# Patient Record
Sex: FEMALE | Race: WHITE | URBAN - METROPOLITAN AREA
[De-identification: names, ages, dates, MRNs, and addresses within clinical notes are randomized per-mention and may not be internally consistent; named-entity substitution may affect disease eponyms.]

---

## 2018-09-11 ENCOUNTER — INPATIENT (INPATIENT)
Facility: HOSPITAL | Age: 84
LOS: 1 days | Discharge: HOME | DRG: 558 | End: 2018-09-13
Attending: HOSPITALIST | Admitting: INTERNAL MEDICINE
Payer: MEDICARE

## 2018-09-11 VITALS
HEART RATE: 83 BPM | TEMPERATURE: 98 F | OXYGEN SATURATION: 98 % | RESPIRATION RATE: 16 BRPM | SYSTOLIC BLOOD PRESSURE: 115 MMHG | DIASTOLIC BLOOD PRESSURE: 76 MMHG | HEIGHT: 63 IN | WEIGHT: 164.02 LBS
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
## 2018-09-11 DIAGNOSIS — R53.1 WEAKNESS: ICD-10-CM

## 2018-09-11 DIAGNOSIS — E86.0 DEHYDRATION: ICD-10-CM

## 2018-09-11 DIAGNOSIS — M62.82 RHABDOMYOLYSIS: ICD-10-CM

## 2018-09-11 DIAGNOSIS — Z29.9 ENCOUNTER FOR PROPHYLACTIC MEASURES, UNSPECIFIED: ICD-10-CM

## 2018-09-11 DIAGNOSIS — N39.0 URINARY TRACT INFECTION, SITE NOT SPECIFIED: ICD-10-CM

## 2018-09-11 DIAGNOSIS — F32.9 MAJOR DEPRESSIVE DISORDER, SINGLE EPISODE, UNSPECIFIED: ICD-10-CM

## 2018-09-11 LAB
ALBUMIN SERPL ELPH-MCNC: 3.7 G/DL — SIGNIFICANT CHANGE UP (ref 3.3–5)
ALP SERPL-CCNC: 90 U/L — SIGNIFICANT CHANGE UP (ref 40–120)
ALT FLD-CCNC: 22 U/L DA — SIGNIFICANT CHANGE UP (ref 10–45)
ANION GAP SERPL CALC-SCNC: 12 MMOL/L — SIGNIFICANT CHANGE UP (ref 5–17)
APPEARANCE UR: ABNORMAL
AST SERPL-CCNC: 38 U/L — SIGNIFICANT CHANGE UP (ref 10–40)
BACTERIA # UR AUTO: ABNORMAL /HPF
BILIRUB SERPL-MCNC: 0.9 MG/DL — SIGNIFICANT CHANGE UP (ref 0.2–1.2)
BILIRUB UR-MCNC: ABNORMAL
BUN SERPL-MCNC: 18 MG/DL — SIGNIFICANT CHANGE UP (ref 7–23)
CALCIUM SERPL-MCNC: 9.5 MG/DL — SIGNIFICANT CHANGE UP (ref 8.4–10.5)
CHLORIDE SERPL-SCNC: 101 MMOL/L — SIGNIFICANT CHANGE UP (ref 96–108)
CK SERPL-CCNC: 466 U/L — HIGH (ref 25–170)
CO2 SERPL-SCNC: 25 MMOL/L — SIGNIFICANT CHANGE UP (ref 22–31)
COLOR SPEC: ABNORMAL
COMMENT - URINE: SIGNIFICANT CHANGE UP
CREAT SERPL-MCNC: 0.74 MG/DL — SIGNIFICANT CHANGE UP (ref 0.5–1.3)
DIFF PNL FLD: ABNORMAL
EPI CELLS # UR: SIGNIFICANT CHANGE UP
GLUCOSE SERPL-MCNC: 141 MG/DL — HIGH (ref 70–99)
GLUCOSE UR QL: NEGATIVE — SIGNIFICANT CHANGE UP
HCT VFR BLD CALC: 48 % — HIGH (ref 34.5–45)
HGB BLD-MCNC: 16.4 G/DL — HIGH (ref 11.5–15.5)
KETONES UR-MCNC: ABNORMAL
LEUKOCYTE ESTERASE UR-ACNC: NEGATIVE — SIGNIFICANT CHANGE UP
MCHC RBC-ENTMCNC: 29.8 PG — SIGNIFICANT CHANGE UP (ref 27–34)
MCHC RBC-ENTMCNC: 34 GM/DL — SIGNIFICANT CHANGE UP (ref 32–36)
MCV RBC AUTO: 87.6 FL — SIGNIFICANT CHANGE UP (ref 80–100)
NITRITE UR-MCNC: POSITIVE
PH UR: 6 — SIGNIFICANT CHANGE UP (ref 5–8)
PLATELET # BLD AUTO: 420 K/UL — HIGH (ref 150–400)
POTASSIUM SERPL-MCNC: 4.2 MMOL/L — SIGNIFICANT CHANGE UP (ref 3.5–5.3)
POTASSIUM SERPL-SCNC: 4.2 MMOL/L — SIGNIFICANT CHANGE UP (ref 3.5–5.3)
PROT SERPL-MCNC: 8.3 G/DL — SIGNIFICANT CHANGE UP (ref 6–8.3)
PROT UR-MCNC: 30 MG/DL
RBC # BLD: 5.48 M/UL — HIGH (ref 3.8–5.2)
RBC # FLD: 11.9 % — SIGNIFICANT CHANGE UP (ref 10.3–14.5)
RBC CASTS # UR COMP ASSIST: SIGNIFICANT CHANGE UP /HPF (ref 0–4)
SODIUM SERPL-SCNC: 138 MMOL/L — SIGNIFICANT CHANGE UP (ref 135–145)
SP GR SPEC: 1.02 — SIGNIFICANT CHANGE UP (ref 1.01–1.02)
UROBILINOGEN FLD QL: 1
WBC # BLD: 10.5 K/UL — SIGNIFICANT CHANGE UP (ref 3.8–10.5)
WBC # FLD AUTO: 10.5 K/UL — SIGNIFICANT CHANGE UP (ref 3.8–10.5)
WBC UR QL: SIGNIFICANT CHANGE UP /HPF (ref 0–5)

## 2018-09-11 PROCEDURE — 99284 EMERGENCY DEPT VISIT MOD MDM: CPT

## 2018-09-11 PROCEDURE — 71045 X-RAY EXAM CHEST 1 VIEW: CPT | Mod: 26

## 2018-09-11 PROCEDURE — 99223 1ST HOSP IP/OBS HIGH 75: CPT

## 2018-09-11 PROCEDURE — 93010 ELECTROCARDIOGRAM REPORT: CPT

## 2018-09-11 RX ORDER — SODIUM CHLORIDE 9 MG/ML
1000 INJECTION INTRAMUSCULAR; INTRAVENOUS; SUBCUTANEOUS ONCE
Qty: 0 | Refills: 0 | Status: COMPLETED | OUTPATIENT
Start: 2018-09-11 | End: 2018-09-11

## 2018-09-11 RX ORDER — ACETAMINOPHEN 500 MG
650 TABLET ORAL EVERY 8 HOURS
Qty: 0 | Refills: 0 | Status: DISCONTINUED | OUTPATIENT
Start: 2018-09-11 | End: 2018-09-12

## 2018-09-11 RX ORDER — INFLUENZA VIRUS VACCINE 15; 15; 15; 15 UG/.5ML; UG/.5ML; UG/.5ML; UG/.5ML
0.5 SUSPENSION INTRAMUSCULAR ONCE
Qty: 0 | Refills: 0 | Status: COMPLETED | OUTPATIENT
Start: 2018-09-11 | End: 2018-09-12

## 2018-09-11 RX ORDER — SODIUM CHLORIDE 9 MG/ML
1000 INJECTION INTRAMUSCULAR; INTRAVENOUS; SUBCUTANEOUS
Qty: 0 | Refills: 0 | Status: DISCONTINUED | OUTPATIENT
Start: 2018-09-11 | End: 2018-09-13

## 2018-09-11 RX ORDER — DESVENLAFAXINE 50 MG/1
100 TABLET, EXTENDED RELEASE ORAL DAILY
Qty: 0 | Refills: 0 | Status: DISCONTINUED | OUTPATIENT
Start: 2018-09-11 | End: 2018-09-13

## 2018-09-11 RX ORDER — CEFTRIAXONE 500 MG/1
1 INJECTION, POWDER, FOR SOLUTION INTRAMUSCULAR; INTRAVENOUS ONCE
Qty: 0 | Refills: 0 | Status: COMPLETED | OUTPATIENT
Start: 2018-09-11 | End: 2018-09-11

## 2018-09-11 RX ORDER — ENOXAPARIN SODIUM 100 MG/ML
40 INJECTION SUBCUTANEOUS DAILY
Qty: 0 | Refills: 0 | Status: DISCONTINUED | OUTPATIENT
Start: 2018-09-11 | End: 2018-09-13

## 2018-09-11 RX ADMIN — Medication 650 MILLIGRAM(S): at 13:32

## 2018-09-11 RX ADMIN — ENOXAPARIN SODIUM 40 MILLIGRAM(S): 100 INJECTION SUBCUTANEOUS at 18:52

## 2018-09-11 RX ADMIN — CEFTRIAXONE 100 GRAM(S): 500 INJECTION, POWDER, FOR SOLUTION INTRAMUSCULAR; INTRAVENOUS at 12:20

## 2018-09-11 RX ADMIN — SODIUM CHLORIDE 75 MILLILITER(S): 9 INJECTION INTRAMUSCULAR; INTRAVENOUS; SUBCUTANEOUS at 22:26

## 2018-09-11 RX ADMIN — CEFTRIAXONE 1 GRAM(S): 500 INJECTION, POWDER, FOR SOLUTION INTRAMUSCULAR; INTRAVENOUS at 13:14

## 2018-09-11 RX ADMIN — SODIUM CHLORIDE 75 MILLILITER(S): 9 INJECTION INTRAMUSCULAR; INTRAVENOUS; SUBCUTANEOUS at 13:33

## 2018-09-11 RX ADMIN — SODIUM CHLORIDE 1000 MILLILITER(S): 9 INJECTION INTRAMUSCULAR; INTRAVENOUS; SUBCUTANEOUS at 10:50

## 2018-09-11 RX ADMIN — SODIUM CHLORIDE 1000 MILLILITER(S): 9 INJECTION INTRAMUSCULAR; INTRAVENOUS; SUBCUTANEOUS at 13:15

## 2018-09-11 RX ADMIN — DESVENLAFAXINE 100 MILLIGRAM(S): 50 TABLET, EXTENDED RELEASE ORAL at 18:52

## 2018-09-11 RX ADMIN — SODIUM CHLORIDE 1000 MILLILITER(S): 9 INJECTION INTRAMUSCULAR; INTRAVENOUS; SUBCUTANEOUS at 11:50

## 2018-09-11 RX ADMIN — Medication 650 MILLIGRAM(S): at 22:26

## 2018-09-11 RX ADMIN — Medication 650 MILLIGRAM(S): at 13:33

## 2018-09-11 RX ADMIN — SODIUM CHLORIDE 1000 MILLILITER(S): 9 INJECTION INTRAMUSCULAR; INTRAVENOUS; SUBCUTANEOUS at 12:21

## 2018-09-11 NOTE — CONSULT NOTE ADULT - SUBJECTIVE AND OBJECTIVE BOX
HPI:   Patient is a 87y female who lives at home and yesterday awakened very weak to the point her legs could not support her. SHe sat on the floor for 24 hours and then called her family today and was taken to the hospital and admitted. We are called. She got some fluid and a dose of ceftriaxone and feels totally back to herself now. She has no dysuria, hematuria, abdomen pain, back pain, cough, sob, n,v,d, ha, joint pain, joint swell, rash, fever, chills, sweats.     REVIEW OF SYSTEMS:  All other review of systems negative (Comprehensive ROS)    PAST MEDICAL & SURGICAL HISTORY:  Depression  No significant past surgical history      Allergies    No Known Allergies    Intolerances        Antimicrobials Day #  :    Other Medications:  acetaminophen   Tablet .. 650 milliGRAM(s) Oral every 8 hours  desvenlafaxine  milliGRAM(s) Oral daily  enoxaparin Injectable 40 milliGRAM(s) SubCutaneous daily  influenza   Vaccine 0.5 milliLiter(s) IntraMuscular once  sodium chloride 0.9%. 1000 milliLiter(s) IV Continuous <Continuous>      FAMILY HISTORY:  Family history of heart disease (Father, Mother)      SOCIAL HISTORY:  Smoking: [ ]Yes [ x]No  ETOH: [ ]Yes [ x]No  Drug Use: [ ]Yes [x ]No   x[ ] Single[ ]    T(F): 97.8 (18 @ 15:25), Max: 97.8 (18 @ 15:25)  HR: 77 (18 @ 15:25)  BP: 120/51 (18 @ 15:25)  RR: 14 (18 @ 15:25)  SpO2: 100% (18 @ 15:25)  Wt(kg): --    PHYSICAL EXAM:  General: alert, no acute distress  Eyes:  anicteric, no conjunctival injection, no discharge  Oropharynx: no lesions or injection 	  Neck: supple, without adenopathy  Lungs: clear to auscultation  Heart: s1s2 2/6 sys m  Abdomen: soft, nondistended, nontender, without mass or organomegaly  Skin: no lesions  Extremities: no clubbing, cyanosis, or edema  Neurologic: alert, oriented, moves all extremities, can stand, walk, good patella reflexes, feels  No back tenderness  LAB RESULTS:                        16.4   10.5  )-----------( 420      ( 11 Sep 2018 10:52 )             48.0         138  |  101  |  18  ----------------------------<  141<H>  4.2   |  25  |  0.74    Ca    9.5      11 Sep 2018 10:52    TPro  8.3  /  Alb  3.7  /  TBili  0.9  /  DBili  x   /  AST  38  /  ALT  22  /  AlkPhos  90      LIVER FUNCTIONS - ( 11 Sep 2018 10:52 )  Alb: 3.7 g/dL / Pro: 8.3 g/dL / ALK PHOS: 90 U/L / ALT: 22 U/L DA / AST: 38 U/L / GGT: x           Urinalysis Basic - ( 11 Sep 2018 11:00 )    Color: Zuleima / Appearance: Slightly Turbid / S.025 / pH: x  Gluc: x / Ketone: Large  / Bili: Small / Urobili: 1   Blood: x / Protein: 30 mg/dL / Nitrite: Positive   Leuk Esterase: Negative / RBC: 0-4 /HPF / WBC 0-2 /HPF   Sq Epi: x / Non Sq Epi: Neg.-Few / Bacteria: Many /HPF        MICROBIOLOGY:  RECENT CULTURES:        RADIOLOGY REVIEWED:      < from: Xray Chest 1 View- PORTABLE-Urgent (18 @ 13:32) >  Impression: Grossly clear lungs.      < end of copied text >      Impression:  Patient who had transient day of weakness that has now resolved. She improved just with fluid and a dose of ceftriaxone however no infection is overtly apparent. There was concern for uti but she has no pyuria or gu symptoms. She has positive nitrate and may have assymptomatic bactiuria but uti is not present.  I find no infection on exam or by history. She is now able to walk and support her weight and has good reflexes and feels everything so spinal cord injury is not apparent either.       Recommendations:  Monitor off antibiotics.

## 2018-09-11 NOTE — H&P ADULT - ATTENDING COMMENTS
Plan of care discussed with patient, and daughter at bedside, agrees, all questions answered.   Josi Cabrera MD

## 2018-09-11 NOTE — ED PROVIDER NOTE - PROGRESS NOTE DETAILS
Oral FRANCES/ANTOINETTE GARCIA to assess if pt can get home care as she wants to go home today. I was advised that this wouldn't be possible and the patient may also work on arranging but will not be set up for today. Patient lives alone . Daughter just bought a new home and it is under construction. May not be safe environment for her. Oral: Patient able to stand with assistance of MD, RN, and PA in the ED. Patient does describe weakness. Nonfocal exam. Pt has CK of 460. Large Ketones, Nitrite positive urine with bacteria.   I D/W Kyung GARCIA to assess if pt can get home care as she has will to go home today. I was advised that this wouldn't be possible and the patient may also work on arranging but will not be set up for today. Patient lives alone . Daughter just bought a new home and it is under construction. May not be safe environment for her. Oral: Patient able to stand with assistance of the MD, RN, and PA while in the ED. Patient does describe weakness. Nonfocal exam. Pt has CK of 460. Large Ketones, Nitrite positive urine with bacteria.   I D/W Kyung GARCIA to assess if pt can get home care as she has will to go home today. I was advised that this wouldn't be possible and the patient may also work on arranging but will not be set up for today. Patient lives alone . Daughter just bought a new home and it is under construction. May not be safe environment for her.

## 2018-09-11 NOTE — ED ADULT NURSE NOTE - NSIMPLEMENTINTERV_GEN_ALL_ED
Implemented All Fall with Harm Risk Interventions:  [place] to call system. Call bell, personal items and telephone within reach. Instruct patient to call for assistance. Room bathroom lighting operational. Non-slip footwear when patient is off stretcher. Physically safe environment: no spills, clutter or unnecessary equipment. Stretcher in lowest position, wheels locked, appropriate side rails in place. Provide visual cue, wrist band, yellow gown, etc. Monitor gait and stability. Monitor for mental status changes and reorient to person, place, and time. Review medications for side effects contributing to fall risk. Reinforce activity limits and safety measures with patient and family. Provide visual clues: red socks.

## 2018-09-11 NOTE — H&P ADULT - NSHPLABSRESULTS_GEN_ALL_CORE
LABS:                        16.4   10.5  )-----------( 420      ( 11 Sep 2018 10:52 )             48.0     -11    138  |  101  |  18  ----------------------------<  141<H>  4.2   |  25  |  0.74    Ca    9.5      11 Sep 2018 10:52    TPro  8.3  /  Alb  3.7  /  TBili  0.9  /  DBili  x   /  AST  38  /  ALT  22  /  AlkPhos  90  09-11      Urinalysis Basic - ( 11 Sep 2018 11:00 )    Color: Zuleima / Appearance: Slightly Turbid / S.025 / pH: x  Gluc: x / Ketone: Large  / Bili: Small / Urobili: 1   Blood: x / Protein: 30 mg/dL / Nitrite: Positive   Leuk Esterase: Negative / RBC: 0-4 /HPF / WBC 0-2 /HPF   Sq Epi: x / Non Sq Epi: Neg.-Few / Bacteria: Many /HPF LABS:                        16.4   10.5  )-----------( 420      ( 11 Sep 2018 10:52 )             48.0     -11    138  |  101  |  18  ----------------------------<  141<H>  4.2   |  25  |  0.74    Ca    9.5      11 Sep 2018 10:52    TPro  8.3  /  Alb  3.7  /  TBili  0.9  /  DBili  x   /  AST  38  /  ALT  22  /  AlkPhos  90  09-11      Urinalysis Basic - ( 11 Sep 2018 11:00 )    Color: Zuleima / Appearance: Slightly Turbid / S.025 / pH: x  Gluc: x / Ketone: Large  / Bili: Small / Urobili: 1   Blood: x / Protein: 30 mg/dL / Nitrite: Positive   Leuk Esterase: Negative / RBC: 0-4 /HPF / WBC 0-2 /HPF   Sq Epi: x / Non Sq Epi: Neg.-Few / Bacteria: Many /HPF        EKG Date/Time: 11-Sep-2018 12:56   Normal sinus rhythm, Rate: 82 LABS:                        16.4   10.5  )-----------( 420      ( 11 Sep 2018 10:52 )             48.0     -11    138  |  101  |  18  ----------------------------<  141<H>  4.2   |  25  |  0.74    Ca    9.5      11 Sep 2018 10:52    TPro  8.3  /  Alb  3.7  /  TBili  0.9  /  DBili  x   /  AST  38  /  ALT  22  /  AlkPhos  90  09-11      Urinalysis Basic - ( 11 Sep 2018 11:00 )  Color: Zuleima / Appearance: Slightly Turbid / S.025 / pH: x  Gluc: x / Ketone: Large  / Bili: Small / Urobili: 1   Blood: x / Protein: 30 mg/dL / Nitrite: Positive   Leuk Esterase: Negative / RBC: 0-4 /HPF / WBC 0-2 /HPF   Sq Epi: x / Non Sq Epi: Neg.-Few / Bacteria: Many /HPF        EKG Date/Time: 11-Sep-2018 12:56   Normal sinus rhythm, Rate: 82

## 2018-09-11 NOTE — ED PROVIDER NOTE - ATTENDING CONTRIBUTION TO CARE
Jean with DILIA Orozco. Patient is with gen weakness and +UTI. Exam nml. Pt able to stand and walk few steps with assistance. Feels weak. Fall risk. No head injury; No LOC; Plan for labs with UA and culture.  I performed a face to face bedside interview with patient regarding history of present illness, review of symptoms and past medical history. I completed an independent physical exam.  I have discussed the patient's plan of care with Physician Assistant (PA). I agree with note as stated above, having amended the EMR as needed to reflect my findings.   This includes History of Present Illness, HIV, Past Medical/Surgical/Family/Social History, Allergies and Home Medications, Review of Systems, Physical Exam, and any Progress Notes during the time I functioned as the attending physician for this patient. Jean with DILIA Orozco. Patient is with gen weakness and +UTI. Exam nonfocal; Feels weak. Fall risk. No head injury; No LOC; Plan for labs with UA and culture. UA with nitrite positivity. Culture sent. 1 dose abx given.  I performed a face to face bedside interview with patient regarding history of present illness, review of symptoms and past medical history. I completed an independent physical exam.  I have discussed the patient's plan of care with Physician Assistant (PA). I agree with note as stated above, having amended the EMR as needed to reflect my findings.   This includes History of Present Illness, HIV, Past Medical/Surgical/Family/Social History, Allergies and Home Medications, Review of Systems, Physical Exam, and any Progress Notes during the time I functioned as the attending physician for this patient. Jean with DILIA Orozco. Patient is with gen weakness and +UTI. Exam nonfocal; Feels weak. Fall risk. No head injury; No LOC; Plan for labs with UA and culture. UA with nitrite positivity. Culture sent. 1 dose abx given. Fall risk if sent home today without assistance. I performed a face to face bedside interview with patient regarding history of present illness, review of symptoms and past medical history. I completed an independent physical exam.  I have discussed the patient's plan of care with Physician Assistant (PA).

## 2018-09-11 NOTE — ED PROVIDER NOTE - DURATION
The patient is awake, alert and acting age appropriately. Family at bedside.    No apparent distress noted. Airway is open and patent.  Respirations with normal effort and rate noted. Explanation of care provided to family and patient. No needs at this time. Will continue to monitor.    yesterday

## 2018-09-11 NOTE — H&P ADULT - PROBLEM SELECTOR PLAN 5
[No] Previous VTE                                                3  [No] Thrombophilia                                             2  [Yes] Lower limb paralysis/ weakness                    2    [No] Current Cancer                                             2   [Yes] Immobilization > 24 hrs                              1  [No] ICU/CCU stay > 24 hours                             1  [Yes] Age > 60                                                     1    IMPROVE VTE Score: 4, DVT ppx with lovenox.

## 2018-09-11 NOTE — ED PROVIDER NOTE - PHYSICAL EXAMINATION
AAOx3  Heart: s1/s2, RRR  Lung: CTA b/l  Abd: soft, NT/ND, no rebound or guarding, NCVAT  Neuro: 5/5 muscle strength of b/l LE and UE, normal sensation in all extremity.  Patient able to stand and ambulate with assistance   2+ pedal pulses b/l   no focal neuro deficits on exam

## 2018-09-11 NOTE — H&P ADULT - HISTORY OF PRESENT ILLNESS
87 year old female presents from home s/p 87 year old female presents from home for complain of generalized weakness x 1 day. Patient reports difficulty walking since yesterday since waking up in the morning. Patient states she felt weak and she sat on floor and was unable to get up the floor for the whole day. Patient denies any fall, loc, headache, dizziness, chest pain, sob, palpitation, abdominal pain, nausea, vomiting, any urinary or bowel complaints. 87 year old female presents from home for complain of generalized weakness x 1 day. Patient reports difficulty walking since yesterday since waking up in the morning. Patient states she felt weak when she woke up and her legs won't hold her and she sat on floor and was unable to get up the floor for the whole day. She was "crawling on her tush". Patient reports intermittent joint pains kaley bilateral shoulders due to her arthritis. Denies any fall, loc, headache, dizziness, chest pain, sob, palpitation, abdominal pain, nausea, vomiting, any urinary or bowel complaints. 87 year old female presents from home for complain of generalized weakness x 1 day. Patient reports difficulty walking since yesterday, since waking up in the morning. Patient states she felt weak when she woke up and her legs won't hold her and she sat on floor and was unable to get up from the floor for the whole day. She was "crawling on her tush". Patient reports intermittent joint pains kaley bilateral shoulders due to her arthritis. Patient states she has a life alert button but it wasn't next to her. Denies any fall, loc, headache, dizziness, chest pain, sob, palpitation, abdominal pain, nausea, vomiting, any urinary or bowel complaints.

## 2018-09-11 NOTE — H&P ADULT - NSHPPHYSICALEXAM_GEN_ALL_CORE
PHYSICAL EXAM:  T(C): 36.5 (09-11-18 @ 11:57), Max: 36.5 (09-11-18 @ 11:57)  HR: 71 (09-11-18 @ 11:57) (71 - 83)  BP: 138/77 (09-11-18 @ 11:57) (115/76 - 138/77)  RR: 15 (09-11-18 @ 11:57) (15 - 16)  SpO2: 97% (09-11-18 @ 11:57) (97% - 98%)      GENERAL: NAD, well-groomed, well-developed  HEAD:  Atraumatic, Normocephalic  EYES: EOMI, PERRL, conjunctiva and sclera clear  ENMT: No tonsillar erythema, exudates, or enlargement; Moist mucous membranes, Good dentition, No lesions  NECK: Supple, No JVD, Normal thyroid  NERVOUS SYSTEM:  Alert & Oriented X3, Good concentration; Motor Strength 5/5 B/L upper and lower extremities; DTRs 2+ intact and symmetric  CHEST/LUNG: Clear to ascultation bilaterally; No rales, rhonchi, wheezing, or rubs  HEART: Regular rate and rhythm; No murmurs, rubs, or gallops  ABDOMEN: Soft, Nontender, Nondistended; Bowel sounds present  EXTREMITIES:  2+ Peripheral Pulses, No clubbing, cyanosis, or edema  SKIN: No rashes or lesions PHYSICAL EXAM:  T(C): 36.5 (09-11-18 @ 11:57), Max: 36.5 (09-11-18 @ 11:57)  HR: 71 (09-11-18 @ 11:57) (71 - 83)  BP: 138/77 (09-11-18 @ 11:57) (115/76 - 138/77)  RR: 15 (09-11-18 @ 11:57) (15 - 16)  SpO2: 97% (09-11-18 @ 11:57) (97% - 98%)      GENERAL: Elderly female, NAD, well-groomed, well-developed  HEAD:  Atraumatic, Normocephalic  EYES: EOMI, PERRL, conjunctiva and sclera clear  ENMT: No tonsillar erythema, exudates, or enlargement; Moist mucous membranes, Good dentition, No lesions  NECK: Supple, No JVD, Normal thyroid  NERVOUS SYSTEM:  Alert & Oriented X3, Good concentration; Motor Strength 5/5 B/L upper and lower extremities; DTRs 2+ intact and symmetric  CHEST/LUNG: Clear to ascultation bilaterally; No rales, rhonchi, wheezing, or rubs  HEART: Regular rate and rhythm; No murmurs, rubs, or gallops  ABDOMEN: Soft, Nontender, Nondistended; Bowel sounds present  EXTREMITIES:  2+ Peripheral Pulses, No clubbing, cyanosis, or edema  SKIN: No rashes or lesions PHYSICAL EXAM:  T(C): 36.5 (09-11-18 @ 11:57), Max: 36.5 (09-11-18 @ 11:57)  HR: 71 (09-11-18 @ 11:57) (71 - 83)  BP: 138/77 (09-11-18 @ 11:57) (115/76 - 138/77)  RR: 15 (09-11-18 @ 11:57) (15 - 16)  SpO2: 97% (09-11-18 @ 11:57) (97% - 98%)      GENERAL: Elderly female, NAD, well-groomed, well-developed  HEAD:  Atraumatic, Normocephalic  EYES: EOMI, PERRL, conjunctiva and sclera clear  ENMT: Dry mucous membranes,   NECK: Supple, No JVD, Normal thyroid  NERVOUS SYSTEM:  Alert & Oriented X3, Good concentration; Motor Strength 5/5 B/L upper and lower extremities;   CHEST/LUNG: Clear to ascultation bilaterally; No rales, rhonchi, wheezing, or rubs  HEART: Regular rate and rhythm; No murmurs, rubs, or gallops  ABDOMEN: Soft, Nontender, Nondistended; Bowel sounds present  EXTREMITIES:  2+ Peripheral Pulses, No clubbing, cyanosis, or edema  SKIN: No rashes or lesions

## 2018-09-11 NOTE — H&P ADULT - PROBLEM SELECTOR PROBLEM 1
Urinary tract infection without hematuria, site unspecified Non-traumatic rhabdomyolysis Dehydration

## 2018-09-11 NOTE — H&P ADULT - PROBLEM SELECTOR PLAN 2
dehydration, gently hydration  monitor labs  PT eval dehydration, gentle hydration  patient given a dose of IV Rocephin in ED, patient afebrile, no wbc, u/a- negative, will not continue antibiotics  monitor labs  PT eval dehydration, gentle hydration  patient given a dose of IV Rocephin in ED, patient afebrile, no urinary symptoms, no wbc, no indication at this time to continue antibiotics, f/u ID  monitor labs  PT eval patient given a dose of IV Rocephin in ED, patient afebrile, no urinary symptoms, no wbc, no indication at this time to continue antibiotics, f/u ID  monitor labs  PT eval

## 2018-09-11 NOTE — ED PROVIDER NOTE - CARE PLAN
Principal Discharge DX:	Weakness  Secondary Diagnosis:	Urinary tract infection without hematuria, site unspecified Principal Discharge DX:	Weakness

## 2018-09-11 NOTE — ED PROVIDER NOTE - OBJECTIVE STATEMENT
86 y/o F was BIB her daughter from home for generalized weakness x yesterday. Patient reports her legs felt weak, and she was unable to bear weight. Per patients daughter, she was in usual state of health 3 days ago, walking without difficulty. Patient denies fall, head trauma, CP, SOB, palpitations,  abd pain, n/v, f/c or urinary symptoms

## 2018-09-11 NOTE — CHART NOTE - NSCHARTNOTEFT_GEN_A_CORE
Called by Dr Mixon regarding pt in ED, pt currently wants to return home but would like someone to stay with her. Advised Dr Mixon that insurance does not cover [facility] care, this is something the patient would have to arrange on her own. Pt currently does not have a home care need.

## 2018-09-11 NOTE — ED PROVIDER NOTE - MEDICAL DECISION MAKING DETAILS
86 y/o F was BIB her daughter from home for generalized weakness x yesterday.  Plan: Labs reviewed, CK-466, pt was hydrated. UA-- UTI noted, urine culture sent. Patient started on Ceftriaxone. She is a fall risk and can not be safely discharged, will admit for generalized weakness.

## 2018-09-12 LAB
24R-OH-CALCIDIOL SERPL-MCNC: 17.5 NG/ML — LOW (ref 30–80)
ALBUMIN SERPL ELPH-MCNC: 2.8 G/DL — LOW (ref 3.3–5)
ALP SERPL-CCNC: 67 U/L — SIGNIFICANT CHANGE UP (ref 40–120)
ALT FLD-CCNC: 23 U/L DA — SIGNIFICANT CHANGE UP (ref 10–45)
ANION GAP SERPL CALC-SCNC: 8 MMOL/L — SIGNIFICANT CHANGE UP (ref 5–17)
AST SERPL-CCNC: 27 U/L — SIGNIFICANT CHANGE UP (ref 10–40)
BASOPHILS # BLD AUTO: 0.1 K/UL — SIGNIFICANT CHANGE UP (ref 0–0.2)
BASOPHILS NFR BLD AUTO: 1 % — SIGNIFICANT CHANGE UP (ref 0–2)
BILIRUB SERPL-MCNC: 0.3 MG/DL — SIGNIFICANT CHANGE UP (ref 0.2–1.2)
BUN SERPL-MCNC: 23 MG/DL — SIGNIFICANT CHANGE UP (ref 7–23)
CALCIUM SERPL-MCNC: 8.8 MG/DL — SIGNIFICANT CHANGE UP (ref 8.4–10.5)
CHLORIDE SERPL-SCNC: 106 MMOL/L — SIGNIFICANT CHANGE UP (ref 96–108)
CK SERPL-CCNC: 265 U/L — HIGH (ref 25–170)
CO2 SERPL-SCNC: 25 MMOL/L — SIGNIFICANT CHANGE UP (ref 22–31)
CREAT SERPL-MCNC: 0.7 MG/DL — SIGNIFICANT CHANGE UP (ref 0.5–1.3)
EOSINOPHIL # BLD AUTO: 0.2 K/UL — SIGNIFICANT CHANGE UP (ref 0–0.5)
EOSINOPHIL NFR BLD AUTO: 3.1 % — SIGNIFICANT CHANGE UP (ref 0–6)
FOLATE SERPL-MCNC: 11 NG/ML — SIGNIFICANT CHANGE UP
GLUCOSE SERPL-MCNC: 140 MG/DL — HIGH (ref 70–99)
HBA1C BLD-MCNC: 6.1 % — HIGH (ref 4–5.6)
HCT VFR BLD CALC: 38.2 % — SIGNIFICANT CHANGE UP (ref 34.5–45)
HGB BLD-MCNC: 12.6 G/DL — SIGNIFICANT CHANGE UP (ref 11.5–15.5)
LYMPHOCYTES # BLD AUTO: 2.4 K/UL — SIGNIFICANT CHANGE UP (ref 1–3.3)
LYMPHOCYTES # BLD AUTO: 43.4 % — SIGNIFICANT CHANGE UP (ref 13–44)
MCHC RBC-ENTMCNC: 29.2 PG — SIGNIFICANT CHANGE UP (ref 27–34)
MCHC RBC-ENTMCNC: 33 GM/DL — SIGNIFICANT CHANGE UP (ref 32–36)
MCV RBC AUTO: 88.2 FL — SIGNIFICANT CHANGE UP (ref 80–100)
MONOCYTES # BLD AUTO: 0.7 K/UL — SIGNIFICANT CHANGE UP (ref 0–0.9)
MONOCYTES NFR BLD AUTO: 12.5 % — HIGH (ref 1–9)
NEUTROPHILS # BLD AUTO: 2.2 K/UL — SIGNIFICANT CHANGE UP (ref 1.8–7.4)
NEUTROPHILS NFR BLD AUTO: 40 % — LOW (ref 43–77)
PLATELET # BLD AUTO: 406 K/UL — HIGH (ref 150–400)
POTASSIUM SERPL-MCNC: 3.5 MMOL/L — SIGNIFICANT CHANGE UP (ref 3.5–5.3)
POTASSIUM SERPL-SCNC: 3.5 MMOL/L — SIGNIFICANT CHANGE UP (ref 3.5–5.3)
PROT SERPL-MCNC: 6.5 G/DL — SIGNIFICANT CHANGE UP (ref 6–8.3)
RBC # BLD: 4.33 M/UL — SIGNIFICANT CHANGE UP (ref 3.8–5.2)
RBC # FLD: 11.9 % — SIGNIFICANT CHANGE UP (ref 10.3–14.5)
SODIUM SERPL-SCNC: 139 MMOL/L — SIGNIFICANT CHANGE UP (ref 135–145)
TSH SERPL-MCNC: 3.64 UIU/ML — SIGNIFICANT CHANGE UP (ref 0.27–4.2)
VIT B12 SERPL-MCNC: 281 PG/ML — SIGNIFICANT CHANGE UP (ref 232–1245)
WBC # BLD: 5.6 K/UL — SIGNIFICANT CHANGE UP (ref 3.8–10.5)
WBC # FLD AUTO: 5.6 K/UL — SIGNIFICANT CHANGE UP (ref 3.8–10.5)

## 2018-09-12 PROCEDURE — 99233 SBSQ HOSP IP/OBS HIGH 50: CPT

## 2018-09-12 PROCEDURE — 93010 ELECTROCARDIOGRAM REPORT: CPT

## 2018-09-12 RX ORDER — ZOLPIDEM TARTRATE 10 MG/1
5 TABLET ORAL AT BEDTIME
Qty: 0 | Refills: 0 | Status: DISCONTINUED | OUTPATIENT
Start: 2018-09-12 | End: 2018-09-13

## 2018-09-12 RX ADMIN — INFLUENZA VIRUS VACCINE 0.5 MILLILITER(S): 15; 15; 15; 15 SUSPENSION INTRAMUSCULAR at 22:12

## 2018-09-12 RX ADMIN — SODIUM CHLORIDE 75 MILLILITER(S): 9 INJECTION INTRAMUSCULAR; INTRAVENOUS; SUBCUTANEOUS at 10:11

## 2018-09-12 RX ADMIN — Medication 650 MILLIGRAM(S): at 07:15

## 2018-09-12 RX ADMIN — ENOXAPARIN SODIUM 40 MILLIGRAM(S): 100 INJECTION SUBCUTANEOUS at 11:17

## 2018-09-12 RX ADMIN — Medication 650 MILLIGRAM(S): at 00:19

## 2018-09-12 RX ADMIN — DESVENLAFAXINE 100 MILLIGRAM(S): 50 TABLET, EXTENDED RELEASE ORAL at 11:17

## 2018-09-12 RX ADMIN — ZOLPIDEM TARTRATE 5 MILLIGRAM(S): 10 TABLET ORAL at 23:08

## 2018-09-12 RX ADMIN — ZOLPIDEM TARTRATE 5 MILLIGRAM(S): 10 TABLET ORAL at 00:20

## 2018-09-12 RX ADMIN — SODIUM CHLORIDE 75 MILLILITER(S): 9 INJECTION INTRAMUSCULAR; INTRAVENOUS; SUBCUTANEOUS at 22:16

## 2018-09-12 RX ADMIN — Medication 650 MILLIGRAM(S): at 05:50

## 2018-09-12 NOTE — PROGRESS NOTE ADULT - ASSESSMENT
87 year old female presented from home for complaints of generalized weakness x 1 day. Patient reported difficulty walking and stated her legs won't hold her  was unable to get up from the floor for the whole day. No symptoms of chest pain, dyspnea, dizziness prior to episode.

## 2018-09-12 NOTE — PHYSICAL THERAPY INITIAL EVALUATION ADULT - CRITERIA FOR SKILLED THERAPEUTIC INTERVENTIONS
functional limitations in following categories/risk reduction/prevention/impairments found/anticipated discharge recommendation

## 2018-09-12 NOTE — PROGRESS NOTE ADULT - ATTENDING COMMENTS
I have personally seen and examined patient on the above date.  I discussed the case with PA and I agree with findings and plan as detailed per note above, which I have amended where appropriate.

## 2018-09-12 NOTE — PROGRESS NOTE ADULT - SUBJECTIVE AND OBJECTIVE BOX
Patient is a 87y old  Female who presents with a chief complaint of weakness (11 Sep 2018 19:16)  Patient seen and examined at bedside.  Today pt. feels better.  No chest pain, no shortness of breath.    ALLERGIES:  No Known Allergies    MEDICATIONS  (STANDING):  desvenlafaxine  milliGRAM(s) Oral daily  enoxaparin Injectable 40 milliGRAM(s) SubCutaneous daily  influenza   Vaccine 0.5 milliLiter(s) IntraMuscular once  sodium chloride 0.9%. 1000 milliLiter(s) (75 mL/Hr) IV Continuous <Continuous>    MEDICATIONS  (PRN):  zolpidem 5 milliGRAM(s) Oral at bedtime PRN Sleep    Vital Signs Last 24 Hrs  T(F): 97.4 (12 Sep 2018 05:47), Max: 97.8 (11 Sep 2018 15:25)  HR: 73 (12 Sep 2018 05:47) (71 - 77)  BP: 132/71 (12 Sep 2018 05:47) (120/51 - 144/59)  RR: 15 (12 Sep 2018 05:47) (14 - 15)  SpO2: 100% (12 Sep 2018 05:47) (97% - 100%)  I&O's Summary    11 Sep 2018 07:01  -  12 Sep 2018 07:00  --------------------------------------------------------  IN: 900 mL / OUT: 1 mL / NET: 899 mL      PHYSICAL EXAM:  General: NAD, A/O x 3  ENT: MMM  Neck: Supple, No JVD  Lungs: Clear to auscultation bilaterally  Cardio: RRR, S1/S2, No murmurs  Abdomen: Soft, Nontender, Nondistended; Bowel sounds present  Extremities: No calf tenderness, No pitting edema    LABS:                        12.6   5.6   )-----------( 406      ( 12 Sep 2018 06:23 )             38.2     -    139  |  106  |  23  ----------------------------<  140  3.5   |  25  |  0.70    Ca    8.8      12 Sep 2018 06:23    TPro  6.5  /  Alb  2.8  /  TBili  0.3  /  DBili  x   /  AST  27  /  ALT  23  /  AlkPhos  67      eGFR if Non African American: 78 mL/min/1.73M2 (18 @ 06:23)  eGFR if : 91 mL/min/1.73M2 (18 @ 06:23)        CARDIAC MARKERS ( 12 Sep 2018 06:35 )  x     / x     / 265 U/L / x     / x      CARDIAC MARKERS ( 11 Sep 2018 10:52 )  x     / x     / 466 U/L / x     / x      CAPILLARY BLOOD GLUCOSE      Urinalysis Basic - ( 11 Sep 2018 11:00 )    Color: Zuleima / Appearance: Slightly Turbid / S.025 / pH: x  Gluc: x / Ketone: Large  / Bili: Small / Urobili: 1   Blood: x / Protein: 30 mg/dL / Nitrite: Positive   Leuk Esterase: Negative / RBC: 0-4 /HPF / WBC 0-2 /HPF   Sq Epi: x / Non Sq Epi: Neg.-Few / Bacteria: Many /HPF          RADIOLOGY & ADDITIONAL TESTS:    Care Discussed with Consultants/Other Providers:

## 2018-09-12 NOTE — PROGRESS NOTE ADULT - SUBJECTIVE AND OBJECTIVE BOX
CC: f/u for  weakness  Patient reports  she feels perfectly well  REVIEW OF SYSTEMS:  All other review of systems negative (Comprehensive ROS)    Antimicrobials Day #  :    Other Medications Reviewed    T(F): 97.4 (18 @ 05:47), Max: 97.4 (18 @ 22:27)  HR: 73 (18 @ 05:47)  BP: 132/71 (18 @ 05:47)  RR: 15 (18 @ 05:47)  SpO2: 100% (18 @ 05:47)  Wt(kg): --    PHYSICAL EXAM:  General: alert, no acute distress  Eyes:  anicteric, no conjunctival injection, no discharge  Oropharynx: no lesions or injection 	  Neck: supple, without adenopathy  Lungs: clear to auscultation  Heart: regular rate and rhythm 2/6 sys m  Abdomen: soft, nondistended, nontender, without mass or organomegaly  Skin: no lesions  Extremities: no clubbing, cyanosis, or edema  Neurologic: alert, oriented, moves all extremities    LAB RESULTS:                        12.6   5.6   )-----------( 406      ( 12 Sep 2018 06:23 )             38.2     -12    139  |  106  |  23  ----------------------------<  140<H>  3.5   |  25  |  0.70    Ca    8.8      12 Sep 2018 06:23    TPro  6.5  /  Alb  2.8<L>  /  TBili  0.3  /  DBili  x   /  AST  27  /  ALT  23  /  AlkPhos  67  -12    LIVER FUNCTIONS - ( 12 Sep 2018 06:23 )  Alb: 2.8 g/dL / Pro: 6.5 g/dL / ALK PHOS: 67 U/L / ALT: 23 U/L DA / AST: 27 U/L / GGT: x           Urinalysis Basic - ( 11 Sep 2018 11:00 )    Color: Zuleima / Appearance: Slightly Turbid / S.025 / pH: x  Gluc: x / Ketone: Large  / Bili: Small / Urobili: 1   Blood: x / Protein: 30 mg/dL / Nitrite: Positive   Leuk Esterase: Negative / RBC: 0-4 /HPF / WBC 0-2 /HPF   Sq Epi: x / Non Sq Epi: Neg.-Few / Bacteria: Many /HPF      MICROBIOLOGY:  RECENT CULTURES:      RADIOLOGY REVIEWED:      < from: Xray Chest 1 View- PORTABLE-Urgent (18 @ 13:32) >    Impression: Grossly clear lungs.        < end of copied text >          Assessment:  Patient admitted with severe diffuse weakness that has resolved. No infection is apparent . she got better from fluid  Plan: monitor off abx  d/c planning

## 2018-09-13 ENCOUNTER — TRANSCRIPTION ENCOUNTER (OUTPATIENT)
Age: 84
End: 2018-09-13

## 2018-09-13 VITALS
WEIGHT: 182.98 LBS | HEART RATE: 71 BPM | OXYGEN SATURATION: 98 % | DIASTOLIC BLOOD PRESSURE: 57 MMHG | RESPIRATION RATE: 14 BRPM | SYSTOLIC BLOOD PRESSURE: 125 MMHG | TEMPERATURE: 98 F

## 2018-09-13 DIAGNOSIS — D47.3 ESSENTIAL (HEMORRHAGIC) THROMBOCYTHEMIA: ICD-10-CM

## 2018-09-13 DIAGNOSIS — E87.6 HYPOKALEMIA: ICD-10-CM

## 2018-09-13 LAB
-  AMIKACIN: SIGNIFICANT CHANGE UP
-  AMOXICILLIN/CLAVULANIC ACID: SIGNIFICANT CHANGE UP
-  AMPICILLIN/SULBACTAM: SIGNIFICANT CHANGE UP
-  AMPICILLIN: SIGNIFICANT CHANGE UP
-  AZTREONAM: SIGNIFICANT CHANGE UP
-  CEFAZOLIN: SIGNIFICANT CHANGE UP
-  CEFEPIME: SIGNIFICANT CHANGE UP
-  CEFOXITIN: SIGNIFICANT CHANGE UP
-  CEFTRIAXONE: SIGNIFICANT CHANGE UP
-  CIPROFLOXACIN: SIGNIFICANT CHANGE UP
-  ERTAPENEM: SIGNIFICANT CHANGE UP
-  GENTAMICIN: SIGNIFICANT CHANGE UP
-  IMIPENEM: SIGNIFICANT CHANGE UP
-  LEVOFLOXACIN: SIGNIFICANT CHANGE UP
-  MEROPENEM: SIGNIFICANT CHANGE UP
-  NITROFURANTOIN: SIGNIFICANT CHANGE UP
-  PIPERACILLIN/TAZOBACTAM: SIGNIFICANT CHANGE UP
-  TIGECYCLINE: SIGNIFICANT CHANGE UP
-  TOBRAMYCIN: SIGNIFICANT CHANGE UP
-  TRIMETHOPRIM/SULFAMETHOXAZOLE: SIGNIFICANT CHANGE UP
ANION GAP SERPL CALC-SCNC: 8 MMOL/L — SIGNIFICANT CHANGE UP (ref 5–17)
BUN SERPL-MCNC: 12 MG/DL — SIGNIFICANT CHANGE UP (ref 7–23)
CALCIUM SERPL-MCNC: 8.6 MG/DL — SIGNIFICANT CHANGE UP (ref 8.4–10.5)
CHLORIDE SERPL-SCNC: 110 MMOL/L — HIGH (ref 96–108)
CK SERPL-CCNC: 412 U/L — HIGH (ref 25–170)
CO2 SERPL-SCNC: 26 MMOL/L — SIGNIFICANT CHANGE UP (ref 22–31)
CREAT SERPL-MCNC: 0.61 MG/DL — SIGNIFICANT CHANGE UP (ref 0.5–1.3)
CULTURE RESULTS: SIGNIFICANT CHANGE UP
GLUCOSE SERPL-MCNC: 122 MG/DL — HIGH (ref 70–99)
HCT VFR BLD CALC: 38.3 % — SIGNIFICANT CHANGE UP (ref 34.5–45)
HGB BLD-MCNC: 12.9 G/DL — SIGNIFICANT CHANGE UP (ref 11.5–15.5)
MCHC RBC-ENTMCNC: 29.7 PG — SIGNIFICANT CHANGE UP (ref 27–34)
MCHC RBC-ENTMCNC: 33.7 GM/DL — SIGNIFICANT CHANGE UP (ref 32–36)
MCV RBC AUTO: 88.1 FL — SIGNIFICANT CHANGE UP (ref 80–100)
METHOD TYPE: SIGNIFICANT CHANGE UP
ORGANISM # SPEC MICROSCOPIC CNT: SIGNIFICANT CHANGE UP
ORGANISM # SPEC MICROSCOPIC CNT: SIGNIFICANT CHANGE UP
PLATELET # BLD AUTO: 440 K/UL — HIGH (ref 150–400)
POTASSIUM SERPL-MCNC: 3.4 MMOL/L — LOW (ref 3.5–5.3)
POTASSIUM SERPL-SCNC: 3.4 MMOL/L — LOW (ref 3.5–5.3)
RBC # BLD: 4.35 M/UL — SIGNIFICANT CHANGE UP (ref 3.8–5.2)
RBC # FLD: 11.7 % — SIGNIFICANT CHANGE UP (ref 10.3–14.5)
SODIUM SERPL-SCNC: 144 MMOL/L — SIGNIFICANT CHANGE UP (ref 135–145)
SPECIMEN SOURCE: SIGNIFICANT CHANGE UP
WBC # BLD: 7.8 K/UL — SIGNIFICANT CHANGE UP (ref 3.8–10.5)
WBC # FLD AUTO: 7.8 K/UL — SIGNIFICANT CHANGE UP (ref 3.8–10.5)

## 2018-09-13 PROCEDURE — 99239 HOSP IP/OBS DSCHRG MGMT >30: CPT

## 2018-09-13 RX ORDER — DESVENLAFAXINE 50 MG/1
1 TABLET, EXTENDED RELEASE ORAL
Qty: 0 | Refills: 0 | COMMUNITY

## 2018-09-13 RX ORDER — POTASSIUM CHLORIDE 20 MEQ
40 PACKET (EA) ORAL ONCE
Qty: 0 | Refills: 0 | Status: COMPLETED | OUTPATIENT
Start: 2018-09-13 | End: 2018-09-13

## 2018-09-13 RX ORDER — SODIUM CHLORIDE 9 MG/ML
500 INJECTION INTRAMUSCULAR; INTRAVENOUS; SUBCUTANEOUS ONCE
Qty: 0 | Refills: 0 | Status: COMPLETED | OUTPATIENT
Start: 2018-09-13 | End: 2018-09-13

## 2018-09-13 RX ORDER — ZOLPIDEM TARTRATE 10 MG/1
5 TABLET ORAL
Qty: 0 | Refills: 0 | COMMUNITY

## 2018-09-13 RX ADMIN — Medication 40 MILLIEQUIVALENT(S): at 10:18

## 2018-09-13 RX ADMIN — SODIUM CHLORIDE 500 MILLILITER(S): 9 INJECTION INTRAMUSCULAR; INTRAVENOUS; SUBCUTANEOUS at 10:16

## 2018-09-13 RX ADMIN — ENOXAPARIN SODIUM 40 MILLIGRAM(S): 100 INJECTION SUBCUTANEOUS at 11:09

## 2018-09-13 RX ADMIN — DESVENLAFAXINE 100 MILLIGRAM(S): 50 TABLET, EXTENDED RELEASE ORAL at 11:09

## 2018-09-13 NOTE — PROGRESS NOTE ADULT - ASSESSMENT
Profound fatigue and rhabdo improved  e coli in urine is asymptomatic bactiuria, no need to treat  Agree with discharge planning, reviewed with daughter.? recent viral syndrome

## 2018-09-13 NOTE — DISCHARGE NOTE ADULT - CARE PLAN
Principal Discharge DX:	Non-traumatic rhabdomyolysis  Goal:	Prevent falling  Assessment and plan of treatment:	Drink 8 glasses of water daily  Secondary Diagnosis:	Dehydration

## 2018-09-13 NOTE — PROGRESS NOTE ADULT - SUBJECTIVE AND OBJECTIVE BOX
Patient is a 87y old  Female who presents with a chief complaint of weakness (12 Sep 2018 15:39)    Patient seen at bedside. Feels stronger today.     Denies chest pain, nausea, vomiting, sob.     Patient seen and examined at bedside.    ALLERGIES:  No Known Allergies    MEDICATIONS  (STANDING):  desvenlafaxine  milliGRAM(s) Oral daily  enoxaparin Injectable 40 milliGRAM(s) SubCutaneous daily  sodium chloride 0.9%. 1000 milliLiter(s) (75 mL/Hr) IV Continuous <Continuous>    MEDICATIONS  (PRN):  zolpidem 5 milliGRAM(s) Oral at bedtime PRN Sleep    Vital Signs Last 24 Hrs  T(F): 98.2 (13 Sep 2018 04:50), Max: 98.2 (13 Sep 2018 04:50)  HR: 71 (13 Sep 2018 04:50) (60 - 78)  BP: 125/57 (13 Sep 2018 04:50) (96/83 - 130/66)  RR: 14 (13 Sep 2018 04:50) (14 - 16)  SpO2: 98% (13 Sep 2018 04:50) (97% - 100%)  I&O's Summary    12 Sep 2018 07:01  -  13 Sep 2018 07:00  --------------------------------------------------------  IN: 1800 mL / OUT: 2 mL / NET: 1798 mL    BMI (kg/m2): 29.1 (18 @ 15:25)  PHYSICAL EXAM:  General: NAD, A/O x 3  ENT: MMM  Neck: Supple, No JVD  Lungs: Clear to auscultation bilaterally  Cardio: RRR, S1/S2, No murmurs  Abdomen: Soft, Nontender, Nondistended; Bowel sounds present  Extremities: No calf tenderness, No pitting edema    LABS:                        12.9   7.8   )-----------( 440      ( 13 Sep 2018 06:46 )             38.3           144  |  110  |  12  ----------------------------<  122  3.4   |  26  |  0.61    Ca    8.6      13 Sep 2018 06:46    TPro  6.5  /  Alb  2.8  /  TBili  0.3  /  DBili  x   /  AST  27  /  ALT  23  /  AlkPhos  67       eGFR if Non African American: 82 mL/min/1.73M2 (18 @ 06:46)  eGFR if African American: 95 mL/min/1.73M2 (18 @ 06:46)         CARDIAC MARKERS ( 12 Sep 2018 06:35 )  x     / x     / 265 U/L / x     / x      CARDIAC MARKERS ( 11 Sep 2018 10:52 )  x     / x     / 466 U/L / x     / x          TSH 3.64   TSH with FT4 reflex --  Total T3 --    CAPILLARY BLOOD GLUCOSE     [id number] 6.1    Urinalysis Basic - ( 11 Sep 2018 11:00 )    Color: [place] / Appearance: Slightly Turbid / S.025 / pH: x  Gluc: x / Ketone: Large  / Bili: Small / Urobili: 1   Blood: x / Protein: 30 mg/dL / Nitrite: Positive   Leuk Esterase: Negative / RBC: 0-4 /HPF / WBC 0-2 /HPF   Sq Epi: x / Non Sq Epi: Neg.-Few / Bacteria: Many /HPF    Culture - Urine (collected 11 Sep 2018 11:00)  Source: .Urine Clean Catch (Midstream)  Preliminary Report (12 Sep 2018 16:55):    >100,000 CFU/ml Escherichia coli    RADIOLOGY & ADDITIONAL TESTS:    Care Discussed with Consultants/Other Providers:

## 2018-09-13 NOTE — PROGRESS NOTE ADULT - SUBJECTIVE AND OBJECTIVE BOX
CC: f/u for weakness and rhabdo    Patient reports: feeling well, ambulatory, minimal myalgias, no headache, no GI or  complaints    REVIEW OF SYSTEMS:  All other review of systems negative (Comprehensive ROS)    Antimicrobials Day # off :    Other Medications Reviewed    T(F): 98.2 (09-13-18 @ 04:50), Max: 98.2 (09-13-18 @ 04:50)  HR: 71 (09-13-18 @ 04:50)  BP: 125/57 (09-13-18 @ 04:50)  RR: 14 (09-13-18 @ 04:50)  SpO2: 98% (09-13-18 @ 04:50)  Wt(kg): --    PHYSICAL EXAM:  General: alert, no acute distress  Eyes:  anicteric, no conjunctival injection, no discharge  Oropharynx: no lesions or injection 	  Neck: supple, without adenopathy  Lungs: clear to auscultation  Heart: regular rate and rhythm; no murmur, rubs or gallops  Abdomen: soft, nondistended, nontender, without mass or organomegaly  Skin: no lesions  Extremities: no clubbing, cyanosis, or edema  Neurologic: alert, oriented, moves all extremities    LAB RESULTS:                        12.9   7.8   )-----------( 440      ( 13 Sep 2018 06:46 )             38.3     09-13    144  |  110<H>  |  12  ----------------------------<  122<H>  3.4<L>   |  26  |  0.61    Ca    8.6      13 Sep 2018 06:46    TPro  6.5  /  Alb  2.8<L>  /  TBili  0.3  /  DBili  x   /  AST  27  /  ALT  23  /  AlkPhos  67  09-12    LIVER FUNCTIONS - ( 12 Sep 2018 06:23 )  Alb: 2.8 g/dL / Pro: 6.5 g/dL / ALK PHOS: 67 U/L / ALT: 23 U/L DA / AST: 27 U/L / GGT: x             MICROBIOLOGY:  RECENT CULTURES:  09-11 @ 11:00 .Urine Clean Catch (Midstream)     >100,000 CFU/ml Escherichia coli          RADIOLOGY REVIEWED:  < from: Xray Chest 1 View- PORTABLE-Urgent (09.11.18 @ 13:32) >  EXAM:  XR CHEST PORTABLE URGENT 1V      PROCEDURE DATE:  09/11/2018        INTERPRETATION:  Admission for weakness.    AP chest.    Heart magnified by AP film shallow inspiration. There is a left   ventricular configuration. Atherosclerotic aorta. Calcified mitral   annulus. Grossly clear lungs. No consolidation or effusion. Moderate   thoracic dextroscoliosis. Specific sclerotic focus left proximal humeral   shaft.    Impression: Grossly clear lungs.    < end of copied text >  < from: Xray Chest 1 View- PORTABLE-Urgent (09.11.18 @ 13:32) >  EXAM:  XR CHEST PORTABLE URGENT 1V      PROCEDURE DATE:  09/11/2018        INTERPRETATION:  Admission for weakness.    AP chest.    Heart magnified by AP film shallow inspiration. There is a left   ventricular configuration. Atherosclerotic aorta. Calcified mitral   annulus. Grossly clear lungs. No consolidation or effusion. Moderate   thoracic dextroscoliosis. Specific sclerotic focus left proximal humeral   shaft.    Impression: Grossly clear lungs.

## 2018-09-13 NOTE — PROGRESS NOTE ADULT - PROBLEM SELECTOR PLAN 2
awaiting PT eval; consider MIGUEL ÁNGEL planning if in a Deconditioned state
follow CK daily, continue IV fluids

## 2018-09-13 NOTE — PROGRESS NOTE ADULT - PROBLEM SELECTOR PLAN 1
follow CK daily, continue IV fluids
awaiting PT eval; consider MIGUEL ÁNGEL planning if in a Deconditioned state

## 2018-09-13 NOTE — DISCHARGE NOTE ADULT - MEDICATION SUMMARY - MEDICATIONS TO TAKE
I will START or STAY ON the medications listed below when I get home from the hospital:    Pristiq 100 mg oral tablet, extended release  -- 1 tab(s) by mouth once a day  -- Indication: For Depression    zolpidem  -- 5 milligram(s) by mouth once a day (at bedtime)  -- Indication: For Insomnia

## 2018-09-13 NOTE — PROGRESS NOTE ADULT - ASSESSMENT
87W presents for generalized weakness found to have mild rhabdomyolysis    Likely discharge today to [facility].

## 2018-09-13 NOTE — DISCHARGE NOTE ADULT - PATIENT PORTAL LINK FT
You can access the [facility] Patient Portal, offered by [facility], by registering with the following website: [email or web address]

## 2018-09-13 NOTE — DISCHARGE NOTE ADULT - HOSPITAL COURSE
87W [facility] depression presents for fall at home, found to have rhabdomyolysis. 87W [facility] depression presents for fall at home, found to have rhabdomyolysis.  Patient started on IV hydration and evaluated by physical therapy.  Her strength returned but will need physical therapy at home.

## 2018-10-24 PROCEDURE — 97116 GAIT TRAINING THERAPY: CPT

## 2018-10-24 PROCEDURE — 99285 EMERGENCY DEPT VISIT HI MDM: CPT | Mod: 25

## 2018-10-24 PROCEDURE — 82550 ASSAY OF CK (CPK): CPT

## 2018-10-24 PROCEDURE — 80053 COMPREHEN METABOLIC PANEL: CPT

## 2018-10-24 PROCEDURE — 96361 HYDRATE IV INFUSION ADD-ON: CPT

## 2018-10-24 PROCEDURE — 87086 URINE CULTURE/COLONY COUNT: CPT

## 2018-10-24 PROCEDURE — 85027 COMPLETE CBC AUTOMATED: CPT

## 2018-10-24 PROCEDURE — 93005 ELECTROCARDIOGRAM TRACING: CPT

## 2018-10-24 PROCEDURE — 97162 PT EVAL MOD COMPLEX 30 MIN: CPT

## 2018-10-24 PROCEDURE — 96374 THER/PROPH/DIAG INJ IV PUSH: CPT

## 2018-10-24 PROCEDURE — 82306 VITAMIN D 25 HYDROXY: CPT

## 2018-10-24 PROCEDURE — 90686 IIV4 VACC NO PRSV 0.5 ML IM: CPT

## 2018-10-24 PROCEDURE — 83036 HEMOGLOBIN GLYCOSYLATED A1C: CPT

## 2018-10-24 PROCEDURE — 81001 URINALYSIS AUTO W/SCOPE: CPT

## 2018-10-24 PROCEDURE — 71045 X-RAY EXAM CHEST 1 VIEW: CPT

## 2018-10-24 PROCEDURE — 82746 ASSAY OF FOLIC ACID SERUM: CPT

## 2018-10-24 PROCEDURE — 84443 ASSAY THYROID STIM HORMONE: CPT

## 2018-10-24 PROCEDURE — 80048 BASIC METABOLIC PNL TOTAL CA: CPT

## 2018-10-24 PROCEDURE — 82607 VITAMIN B-12: CPT

## 2018-10-24 PROCEDURE — 87186 SC STD MICRODIL/AGAR DIL: CPT

## 2020-10-28 NOTE — PHYSICAL THERAPY INITIAL EVALUATION ADULT - LEVEL OF INDEPENDENCE: SIT/STAND, REHAB EVAL
PDMP reviewed; no aberrant behavior identified, prescription authorized.    I will not be able to refill this beyond this month. It has been over 2 years since I have seen Susan   The patient's electronic chart was reviewed during this month. The patient's medical, functional, and psychosocial needs were assessed. Need for Home health care, PT, OT, , psychiatric care, and hospice was assessed. All preventative care measures were reviewed and updated. All medications were reviewed and reconciled. Potential drug interactions and medication adherence was reviewed. Prescriptions were renewed as appropriate. Education was provided to the patient and/or caregiver as needed, and all questions were answered. Over 20 minutes were spent providing these non-face-to-face services during this calendar month.    Refilled Atorva.   contact guard

## 2025-01-01 ENCOUNTER — INPATIENT (INPATIENT)
Facility: HOSPITAL | Age: 89
LOS: 0 days | DRG: 66 | End: 2025-04-17
Attending: FAMILY MEDICINE | Admitting: INTERNAL MEDICINE
Payer: MEDICARE

## 2025-01-01 VITALS
TEMPERATURE: 100 F | HEART RATE: 94 BPM | OXYGEN SATURATION: 96 % | HEIGHT: 60 IN | DIASTOLIC BLOOD PRESSURE: 87 MMHG | SYSTOLIC BLOOD PRESSURE: 114 MMHG | WEIGHT: 152.78 LBS | RESPIRATION RATE: 28 BRPM

## 2025-01-01 VITALS
DIASTOLIC BLOOD PRESSURE: 66 MMHG | HEART RATE: 134 BPM | TEMPERATURE: 99 F | RESPIRATION RATE: 30 BRPM | SYSTOLIC BLOOD PRESSURE: 98 MMHG | OXYGEN SATURATION: 55 %

## 2025-01-01 DIAGNOSIS — R06.82 TACHYPNEA, NOT ELSEWHERE CLASSIFIED: ICD-10-CM

## 2025-01-01 DIAGNOSIS — I61.9 NONTRAUMATIC INTRACEREBRAL HEMORRHAGE, UNSPECIFIED: ICD-10-CM

## 2025-01-01 DIAGNOSIS — Z51.5 ENCOUNTER FOR PALLIATIVE CARE: ICD-10-CM

## 2025-01-01 DIAGNOSIS — Z71.89 OTHER SPECIFIED COUNSELING: ICD-10-CM

## 2025-01-01 LAB
A1C WITH ESTIMATED AVERAGE GLUCOSE RESULT: 6.7 % — HIGH (ref 4–5.6)
ANION GAP SERPL CALC-SCNC: 20 MMOL/L — HIGH (ref 5–17)
BLD GP AB SCN SERPL QL: NEGATIVE — SIGNIFICANT CHANGE UP
BUN SERPL-MCNC: 19 MG/DL — SIGNIFICANT CHANGE UP (ref 7–23)
CALCIUM SERPL-MCNC: 8 MG/DL — LOW (ref 8.4–10.5)
CHLORIDE SERPL-SCNC: 102 MMOL/L — SIGNIFICANT CHANGE UP (ref 96–108)
CHOLEST SERPL-MCNC: 224 MG/DL — HIGH
CO2 SERPL-SCNC: 15 MMOL/L — LOW (ref 22–31)
CREAT SERPL-MCNC: 0.79 MG/DL — SIGNIFICANT CHANGE UP (ref 0.5–1.3)
EGFR: 70 ML/MIN/1.73M2 — SIGNIFICANT CHANGE UP
EGFR: 70 ML/MIN/1.73M2 — SIGNIFICANT CHANGE UP
ESTIMATED AVERAGE GLUCOSE: 146 MG/DL — HIGH (ref 68–114)
GAS PNL BLDA: SIGNIFICANT CHANGE UP
GLUCOSE BLDC GLUCOMTR-MCNC: 224 MG/DL — HIGH (ref 70–99)
GLUCOSE BLDC GLUCOMTR-MCNC: 235 MG/DL — HIGH (ref 70–99)
GLUCOSE BLDC GLUCOMTR-MCNC: 248 MG/DL — HIGH (ref 70–99)
GLUCOSE SERPL-MCNC: 270 MG/DL — HIGH (ref 70–99)
HCT VFR BLD CALC: 42.1 % — SIGNIFICANT CHANGE UP (ref 34.5–45)
HDLC SERPL-MCNC: 83 MG/DL — SIGNIFICANT CHANGE UP
HGB BLD-MCNC: 13 G/DL — SIGNIFICANT CHANGE UP (ref 11.5–15.5)
LDLC SERPL-MCNC: 117 MG/DL — HIGH
LIPID PNL WITH DIRECT LDL SERPL: 117 MG/DL — HIGH
MAGNESIUM SERPL-MCNC: 1.8 MG/DL — SIGNIFICANT CHANGE UP (ref 1.6–2.6)
MCHC RBC-ENTMCNC: 26 PG — LOW (ref 27–34)
MCHC RBC-ENTMCNC: 30.9 G/DL — LOW (ref 32–36)
MCV RBC AUTO: 84.2 FL — SIGNIFICANT CHANGE UP (ref 80–100)
MRSA PCR RESULT.: SIGNIFICANT CHANGE UP
NONHDLC SERPL-MCNC: 141 MG/DL — HIGH
NRBC BLD AUTO-RTO: 0 /100 WBCS — SIGNIFICANT CHANGE UP (ref 0–0)
PHOSPHATE SERPL-MCNC: 3.6 MG/DL — SIGNIFICANT CHANGE UP (ref 2.5–4.5)
PLATELET # BLD AUTO: 392 K/UL — SIGNIFICANT CHANGE UP (ref 150–400)
POTASSIUM SERPL-MCNC: 4.1 MMOL/L — SIGNIFICANT CHANGE UP (ref 3.5–5.3)
POTASSIUM SERPL-SCNC: 4.1 MMOL/L — SIGNIFICANT CHANGE UP (ref 3.5–5.3)
RBC # BLD: 5 M/UL — SIGNIFICANT CHANGE UP (ref 3.8–5.2)
RBC # FLD: 13.7 % — SIGNIFICANT CHANGE UP (ref 10.3–14.5)
RH IG SCN BLD-IMP: POSITIVE — SIGNIFICANT CHANGE UP
S AUREUS DNA NOSE QL NAA+PROBE: SIGNIFICANT CHANGE UP
SODIUM SERPL-SCNC: 137 MMOL/L — SIGNIFICANT CHANGE UP (ref 135–145)
T3 SERPL-MCNC: 73 NG/DL — LOW (ref 80–200)
T4 AB SER-ACNC: 6.6 UG/DL — SIGNIFICANT CHANGE UP (ref 4.6–12)
T4 FREE SERPL-MCNC: 1 NG/DL — SIGNIFICANT CHANGE UP (ref 0.9–1.8)
TRIGL SERPL-MCNC: 138 MG/DL — SIGNIFICANT CHANGE UP
TROPONIN T, HIGH SENSITIVITY RESULT: 1821 NG/L — HIGH (ref 0–51)
TSH SERPL-MCNC: 1.6 UIU/ML — SIGNIFICANT CHANGE UP (ref 0.27–4.2)
WBC # BLD: 18.65 K/UL — HIGH (ref 3.8–10.5)
WBC # FLD AUTO: 18.65 K/UL — HIGH (ref 3.8–10.5)

## 2025-01-01 PROCEDURE — 83605 ASSAY OF LACTIC ACID: CPT

## 2025-01-01 PROCEDURE — 87641 MR-STAPH DNA AMP PROBE: CPT

## 2025-01-01 PROCEDURE — 71045 X-RAY EXAM CHEST 1 VIEW: CPT | Mod: 26

## 2025-01-01 PROCEDURE — 99291 CRITICAL CARE FIRST HOUR: CPT

## 2025-01-01 PROCEDURE — 99232 SBSQ HOSP IP/OBS MODERATE 35: CPT | Mod: GC

## 2025-01-01 PROCEDURE — 84100 ASSAY OF PHOSPHORUS: CPT

## 2025-01-01 PROCEDURE — 84436 ASSAY OF TOTAL THYROXINE: CPT

## 2025-01-01 PROCEDURE — 85014 HEMATOCRIT: CPT

## 2025-01-01 PROCEDURE — 86901 BLOOD TYPING SEROLOGIC RH(D): CPT

## 2025-01-01 PROCEDURE — 82962 GLUCOSE BLOOD TEST: CPT

## 2025-01-01 PROCEDURE — 86900 BLOOD TYPING SEROLOGIC ABO: CPT

## 2025-01-01 PROCEDURE — 82803 BLOOD GASES ANY COMBINATION: CPT

## 2025-01-01 PROCEDURE — 99223 1ST HOSP IP/OBS HIGH 75: CPT

## 2025-01-01 PROCEDURE — 84295 ASSAY OF SERUM SODIUM: CPT

## 2025-01-01 PROCEDURE — 80048 BASIC METABOLIC PNL TOTAL CA: CPT

## 2025-01-01 PROCEDURE — 85018 HEMOGLOBIN: CPT

## 2025-01-01 PROCEDURE — 83735 ASSAY OF MAGNESIUM: CPT

## 2025-01-01 PROCEDURE — 71045 X-RAY EXAM CHEST 1 VIEW: CPT

## 2025-01-01 PROCEDURE — 94002 VENT MGMT INPAT INIT DAY: CPT

## 2025-01-01 PROCEDURE — 93970 EXTREMITY STUDY: CPT

## 2025-01-01 PROCEDURE — 87640 STAPH A DNA AMP PROBE: CPT

## 2025-01-01 PROCEDURE — 82330 ASSAY OF CALCIUM: CPT

## 2025-01-01 PROCEDURE — 84480 ASSAY TRIIODOTHYRONINE (T3): CPT

## 2025-01-01 PROCEDURE — 86850 RBC ANTIBODY SCREEN: CPT

## 2025-01-01 PROCEDURE — 82947 ASSAY GLUCOSE BLOOD QUANT: CPT

## 2025-01-01 PROCEDURE — 80061 LIPID PANEL: CPT

## 2025-01-01 PROCEDURE — 84484 ASSAY OF TROPONIN QUANT: CPT

## 2025-01-01 PROCEDURE — 82435 ASSAY OF BLOOD CHLORIDE: CPT

## 2025-01-01 PROCEDURE — 83036 HEMOGLOBIN GLYCOSYLATED A1C: CPT

## 2025-01-01 PROCEDURE — 85027 COMPLETE CBC AUTOMATED: CPT

## 2025-01-01 PROCEDURE — 84132 ASSAY OF SERUM POTASSIUM: CPT

## 2025-01-01 PROCEDURE — 84439 ASSAY OF FREE THYROXINE: CPT

## 2025-01-01 PROCEDURE — 84443 ASSAY THYROID STIM HORMONE: CPT

## 2025-01-01 PROCEDURE — 93970 EXTREMITY STUDY: CPT | Mod: 26

## 2025-01-01 RX ORDER — ONDANSETRON HCL/PF 4 MG/2 ML
4 VIAL (ML) INJECTION EVERY 6 HOURS
Refills: 0 | Status: DISCONTINUED | OUTPATIENT
Start: 2025-01-01 | End: 2025-01-01

## 2025-01-01 RX ORDER — LORAZEPAM 4 MG/ML
1 VIAL (ML) INJECTION EVERY 4 HOURS
Refills: 0 | Status: DISCONTINUED | OUTPATIENT
Start: 2025-01-01 | End: 2025-01-01

## 2025-01-01 RX ORDER — ACETAMINOPHEN 500 MG/5ML
650 LIQUID (ML) ORAL EVERY 6 HOURS
Refills: 0 | Status: DISCONTINUED | OUTPATIENT
Start: 2025-01-01 | End: 2025-01-01

## 2025-01-01 RX ORDER — NOREPINEPHRINE BITARTRATE 8 MG
0.05 SOLUTION INTRAVENOUS
Qty: 8 | Refills: 0 | Status: DISCONTINUED | OUTPATIENT
Start: 2025-01-01 | End: 2025-01-01

## 2025-01-01 RX ORDER — HYDROMORPHONE/SOD CHLOR,ISO/PF 2 MG/10 ML
1.5 SYRINGE (ML) INJECTION
Refills: 0 | Status: DISCONTINUED | OUTPATIENT
Start: 2025-01-01 | End: 2025-01-01

## 2025-01-01 RX ORDER — HYDROMORPHONE/SOD CHLOR,ISO/PF 2 MG/10 ML
1 SYRINGE (ML) INJECTION
Refills: 0 | Status: DISCONTINUED | OUTPATIENT
Start: 2025-01-01 | End: 2025-01-01

## 2025-01-01 RX ORDER — DEXMEDETOMIDINE HYDROCHLORIDE IN SODIUM CHLORIDE 4 UG/ML
0.3 INJECTION INTRAVENOUS
Qty: 200 | Refills: 0 | Status: DISCONTINUED | OUTPATIENT
Start: 2025-01-01 | End: 2025-01-01

## 2025-01-01 RX ORDER — PROPOFOL 10 MG/ML
0.19 INJECTION, EMULSION INTRAVENOUS
Qty: 1000 | Refills: 0 | Status: DISCONTINUED | OUTPATIENT
Start: 2025-01-01 | End: 2025-01-01

## 2025-01-01 RX ORDER — LEVETIRACETAM 10 MG/ML
500 INJECTION, SOLUTION INTRAVENOUS EVERY 12 HOURS
Refills: 0 | Status: DISCONTINUED | OUTPATIENT
Start: 2025-01-01 | End: 2025-01-01

## 2025-01-01 RX ORDER — SODIUM CHLORIDE 3 G/100ML
1000 INJECTION, SOLUTION INTRAVENOUS
Refills: 0 | Status: DISCONTINUED | OUTPATIENT
Start: 2025-01-01 | End: 2025-01-01

## 2025-01-01 RX ORDER — BISACODYL 5 MG
10 TABLET, DELAYED RELEASE (ENTERIC COATED) ORAL DAILY
Refills: 0 | Status: DISCONTINUED | OUTPATIENT
Start: 2025-01-01 | End: 2025-01-01

## 2025-01-01 RX ORDER — DEXMEDETOMIDINE HYDROCHLORIDE IN SODIUM CHLORIDE 4 UG/ML
0.3 INJECTION INTRAVENOUS
Qty: 400 | Refills: 0 | Status: DISCONTINUED | OUTPATIENT
Start: 2025-01-01 | End: 2025-01-01

## 2025-01-01 RX ORDER — GLYCOPYRROLATE 0.2 MG/ML
0.4 INJECTION INTRAMUSCULAR; INTRAVENOUS
Refills: 0 | Status: DISCONTINUED | OUTPATIENT
Start: 2025-01-01 | End: 2025-01-01

## 2025-01-01 RX ORDER — HYDROMORPHONE/SOD CHLOR,ISO/PF 2 MG/10 ML
1 SYRINGE (ML) INJECTION EVERY 6 HOURS
Refills: 0 | Status: DISCONTINUED | OUTPATIENT
Start: 2025-01-01 | End: 2025-01-01

## 2025-01-01 RX ORDER — LORAZEPAM 4 MG/ML
0.5 VIAL (ML) INJECTION
Refills: 0 | Status: DISCONTINUED | OUTPATIENT
Start: 2025-01-01 | End: 2025-01-01

## 2025-01-01 RX ORDER — ACETAMINOPHEN 500 MG/5ML
1000 LIQUID (ML) ORAL ONCE
Refills: 0 | Status: COMPLETED | OUTPATIENT
Start: 2025-01-01 | End: 2025-01-01

## 2025-01-01 RX ORDER — PROPOFOL 10 MG/ML
0.2 INJECTION, EMULSION INTRAVENOUS
Qty: 1000 | Refills: 0 | Status: DISCONTINUED | OUTPATIENT
Start: 2025-01-01 | End: 2025-01-01

## 2025-01-01 RX ORDER — PROPOFOL 10 MG/ML
10 INJECTION, EMULSION INTRAVENOUS
Qty: 1000 | Refills: 0 | Status: DISCONTINUED | OUTPATIENT
Start: 2025-01-01 | End: 2025-01-01

## 2025-01-01 RX ORDER — ACETAMINOPHEN 500 MG/5ML
80 LIQUID (ML) ORAL EVERY 6 HOURS
Refills: 0 | Status: DISCONTINUED | OUTPATIENT
Start: 2025-01-01 | End: 2025-01-01

## 2025-01-01 RX ORDER — HYDROMORPHONE/SOD CHLOR,ISO/PF 2 MG/10 ML
0.5 SYRINGE (ML) INJECTION
Refills: 0 | Status: DISCONTINUED | OUTPATIENT
Start: 2025-01-01 | End: 2025-01-01

## 2025-01-01 RX ORDER — DEXTROSE 50 % IN WATER 50 %
25 SYRINGE (ML) INTRAVENOUS
Refills: 0 | Status: DISCONTINUED | OUTPATIENT
Start: 2025-01-01 | End: 2025-01-01

## 2025-01-01 RX ORDER — DEXTROSE 50 % IN WATER 50 %
50 SYRINGE (ML) INTRAVENOUS
Refills: 0 | Status: DISCONTINUED | OUTPATIENT
Start: 2025-01-01 | End: 2025-01-01

## 2025-01-01 RX ORDER — SENNA 187 MG
2 TABLET ORAL AT BEDTIME
Refills: 0 | Status: DISCONTINUED | OUTPATIENT
Start: 2025-01-01 | End: 2025-01-01

## 2025-01-01 RX ORDER — POLYETHYLENE GLYCOL 3350 17 G/17G
17 POWDER, FOR SOLUTION ORAL DAILY
Refills: 0 | Status: DISCONTINUED | OUTPATIENT
Start: 2025-01-01 | End: 2025-01-01

## 2025-01-01 RX ADMIN — GLYCOPYRROLATE 0.4 MILLIGRAM(S): 0.2 INJECTION INTRAMUSCULAR; INTRAVENOUS at 22:22

## 2025-01-01 RX ADMIN — Medication 0.5 MILLIGRAM(S): at 00:30

## 2025-01-01 RX ADMIN — Medication 0.5 MILLIGRAM(S): at 18:15

## 2025-01-01 RX ADMIN — Medication 1 MILLIGRAM(S): at 12:07

## 2025-01-01 RX ADMIN — Medication 0.5 MILLIGRAM(S): at 00:45

## 2025-01-01 RX ADMIN — Medication 0.5 MILLIGRAM(S): at 15:11

## 2025-01-01 RX ADMIN — NOREPINEPHRINE BITARTRATE 6.5 MICROGRAM(S)/KG/MIN: 8 SOLUTION at 16:29

## 2025-01-01 RX ADMIN — LEVETIRACETAM 500 MILLIGRAM(S): 10 INJECTION, SOLUTION INTRAVENOUS at 05:24

## 2025-01-01 RX ADMIN — Medication 2 UNIT(S)/HR: at 16:00

## 2025-01-01 RX ADMIN — Medication 1.5 MILLIGRAM(S): at 14:03

## 2025-01-01 RX ADMIN — PROPOFOL 4.16 MICROGRAM(S)/KG/MIN: 10 INJECTION, EMULSION INTRAVENOUS at 14:23

## 2025-01-01 RX ADMIN — GLYCOPYRROLATE 0.4 MILLIGRAM(S): 0.2 INJECTION INTRAMUSCULAR; INTRAVENOUS at 05:24

## 2025-01-01 RX ADMIN — Medication 400 MILLIGRAM(S): at 17:47

## 2025-01-01 RX ADMIN — LEVETIRACETAM 500 MILLIGRAM(S): 10 INJECTION, SOLUTION INTRAVENOUS at 17:47

## 2025-01-01 RX ADMIN — Medication 0.5 MILLIGRAM(S): at 03:45

## 2025-01-01 RX ADMIN — Medication 2 UNIT(S): at 16:11

## 2025-01-01 RX ADMIN — GLYCOPYRROLATE 0.4 MILLIGRAM(S): 0.2 INJECTION INTRAMUSCULAR; INTRAVENOUS at 18:40

## 2025-01-01 RX ADMIN — Medication 500 MILLILITER(S): at 14:23

## 2025-01-01 RX ADMIN — Medication 0.5 MILLIGRAM(S): at 07:35

## 2025-01-01 RX ADMIN — Medication 0.5 MILLIGRAM(S): at 03:30

## 2025-01-01 RX ADMIN — Medication 1 MILLIGRAM(S): at 10:05

## 2025-01-01 RX ADMIN — Medication 0.5 MILLIGRAM(S): at 07:20

## 2025-01-01 RX ADMIN — Medication 400 MILLIGRAM(S): at 05:24

## 2025-01-31 NOTE — DISCHARGE NOTE ADULT - CONDITION (STATED IN TERMS THAT PERMIT A SPECIFIC MEASURABLE COMPARISON WITH CONDITION ON ADMISSION):
A percutaneous stick to the right internal jugular vein was performed. Ultrasound guidance was used to obtain access. Good

## 2025-03-18 ENCOUNTER — EMERGENCY (EMERGENCY)
Facility: HOSPITAL | Age: 89
LOS: 1 days | Discharge: HOME | End: 2025-03-18
Attending: EMERGENCY MEDICINE | Admitting: EMERGENCY MEDICINE
Payer: MEDICARE

## 2025-03-18 VITALS
SYSTOLIC BLOOD PRESSURE: 167 MMHG | WEIGHT: 160.06 LBS | DIASTOLIC BLOOD PRESSURE: 77 MMHG | OXYGEN SATURATION: 95 % | TEMPERATURE: 98 F | HEART RATE: 75 BPM | HEIGHT: 66 IN | RESPIRATION RATE: 18 BRPM

## 2025-03-18 PROBLEM — F32.9 MAJOR DEPRESSIVE DISORDER, SINGLE EPISODE, UNSPECIFIED: Chronic | Status: ACTIVE | Noted: 2018-09-11

## 2025-03-18 LAB
ALBUMIN SERPL ELPH-MCNC: 3.5 G/DL — SIGNIFICANT CHANGE UP (ref 3.3–5)
ALP SERPL-CCNC: 95 U/L — SIGNIFICANT CHANGE UP (ref 40–120)
ALT FLD-CCNC: 18 U/L — SIGNIFICANT CHANGE UP (ref 10–45)
ANION GAP SERPL CALC-SCNC: 11 MMOL/L — SIGNIFICANT CHANGE UP (ref 5–17)
APPEARANCE UR: CLEAR — SIGNIFICANT CHANGE UP
AST SERPL-CCNC: 40 U/L — SIGNIFICANT CHANGE UP (ref 10–40)
BACTERIA # UR AUTO: ABNORMAL /HPF
BASOPHILS # BLD AUTO: 0.04 K/UL — SIGNIFICANT CHANGE UP (ref 0–0.2)
BASOPHILS NFR BLD AUTO: 0.4 % — SIGNIFICANT CHANGE UP (ref 0–2)
BILIRUB SERPL-MCNC: 0.6 MG/DL — SIGNIFICANT CHANGE UP (ref 0.2–1.2)
BILIRUB UR-MCNC: NEGATIVE — SIGNIFICANT CHANGE UP
BUN SERPL-MCNC: 19 MG/DL — SIGNIFICANT CHANGE UP (ref 7–23)
CALCIUM SERPL-MCNC: 8.9 MG/DL — SIGNIFICANT CHANGE UP (ref 8.4–10.5)
CHLORIDE SERPL-SCNC: 105 MMOL/L — SIGNIFICANT CHANGE UP (ref 96–108)
CO2 SERPL-SCNC: 25 MMOL/L — SIGNIFICANT CHANGE UP (ref 22–31)
COLOR SPEC: YELLOW — SIGNIFICANT CHANGE UP
CREAT SERPL-MCNC: 0.66 MG/DL — SIGNIFICANT CHANGE UP (ref 0.5–1.3)
DIFF PNL FLD: NEGATIVE — SIGNIFICANT CHANGE UP
EGFR: 82 ML/MIN/1.73M2 — SIGNIFICANT CHANGE UP
EGFR: 82 ML/MIN/1.73M2 — SIGNIFICANT CHANGE UP
EOSINOPHIL # BLD AUTO: 0.04 K/UL — SIGNIFICANT CHANGE UP (ref 0–0.5)
EOSINOPHIL NFR BLD AUTO: 0.4 % — SIGNIFICANT CHANGE UP (ref 0–6)
EPI CELLS # UR: SIGNIFICANT CHANGE UP
GLUCOSE SERPL-MCNC: 133 MG/DL — HIGH (ref 70–99)
GLUCOSE UR QL: NEGATIVE MG/DL — SIGNIFICANT CHANGE UP
HCT VFR BLD CALC: 37.3 % — SIGNIFICANT CHANGE UP (ref 34.5–45)
HGB BLD-MCNC: 12 G/DL — SIGNIFICANT CHANGE UP (ref 11.5–15.5)
IMM GRANULOCYTES NFR BLD AUTO: 0.2 % — SIGNIFICANT CHANGE UP (ref 0–0.9)
KETONES UR-MCNC: NEGATIVE MG/DL — SIGNIFICANT CHANGE UP
LEUKOCYTE ESTERASE UR-ACNC: NEGATIVE — SIGNIFICANT CHANGE UP
LYMPHOCYTES # BLD AUTO: 1.37 K/UL — SIGNIFICANT CHANGE UP (ref 1–3.3)
LYMPHOCYTES # BLD AUTO: 12.7 % — LOW (ref 13–44)
MCHC RBC-ENTMCNC: 26.7 PG — LOW (ref 27–34)
MCHC RBC-ENTMCNC: 32.2 G/DL — SIGNIFICANT CHANGE UP (ref 32–36)
MCV RBC AUTO: 83.1 FL — SIGNIFICANT CHANGE UP (ref 80–100)
MONOCYTES # BLD AUTO: 0.89 K/UL — SIGNIFICANT CHANGE UP (ref 0–0.9)
MONOCYTES NFR BLD AUTO: 8.3 % — SIGNIFICANT CHANGE UP (ref 2–14)
NEUTROPHILS # BLD AUTO: 8.41 K/UL — HIGH (ref 1.8–7.4)
NEUTROPHILS NFR BLD AUTO: 78 % — HIGH (ref 43–77)
NITRITE UR-MCNC: POSITIVE
NRBC BLD AUTO-RTO: 0 /100 WBCS — SIGNIFICANT CHANGE UP (ref 0–0)
PH UR: 7 — SIGNIFICANT CHANGE UP (ref 5–8)
PLATELET # BLD AUTO: 416 K/UL — HIGH (ref 150–400)
POTASSIUM SERPL-MCNC: 4.7 MMOL/L — SIGNIFICANT CHANGE UP (ref 3.5–5.3)
POTASSIUM SERPL-SCNC: 4.7 MMOL/L — SIGNIFICANT CHANGE UP (ref 3.5–5.3)
PROT SERPL-MCNC: 7.1 G/DL — SIGNIFICANT CHANGE UP (ref 6–8.3)
PROT UR-MCNC: NEGATIVE MG/DL — SIGNIFICANT CHANGE UP
RBC # BLD: 4.49 M/UL — SIGNIFICANT CHANGE UP (ref 3.8–5.2)
RBC # FLD: 13.6 % — SIGNIFICANT CHANGE UP (ref 10.3–14.5)
RBC CASTS # UR COMP ASSIST: 0 /HPF — SIGNIFICANT CHANGE UP (ref 0–4)
SODIUM SERPL-SCNC: 141 MMOL/L — SIGNIFICANT CHANGE UP (ref 135–145)
SP GR SPEC: 1.02 — SIGNIFICANT CHANGE UP (ref 1–1.03)
UROBILINOGEN FLD QL: 1 MG/DL — SIGNIFICANT CHANGE UP (ref 0.2–1)
WBC # BLD: 10.77 K/UL — HIGH (ref 3.8–10.5)
WBC # FLD AUTO: 10.77 K/UL — HIGH (ref 3.8–10.5)
WBC UR QL: 0 /HPF — SIGNIFICANT CHANGE UP (ref 0–5)

## 2025-03-18 PROCEDURE — 70450 CT HEAD/BRAIN W/O DYE: CPT | Mod: 26

## 2025-03-18 PROCEDURE — 81001 URINALYSIS AUTO W/SCOPE: CPT

## 2025-03-18 PROCEDURE — 85025 COMPLETE CBC W/AUTO DIFF WBC: CPT

## 2025-03-18 PROCEDURE — 70450 CT HEAD/BRAIN W/O DYE: CPT | Mod: MC

## 2025-03-18 PROCEDURE — 87186 SC STD MICRODIL/AGAR DIL: CPT

## 2025-03-18 PROCEDURE — 80053 COMPREHEN METABOLIC PANEL: CPT

## 2025-03-18 PROCEDURE — 99284 EMERGENCY DEPT VISIT MOD MDM: CPT | Mod: 25

## 2025-03-18 PROCEDURE — 87086 URINE CULTURE/COLONY COUNT: CPT

## 2025-03-18 PROCEDURE — 99284 EMERGENCY DEPT VISIT MOD MDM: CPT

## 2025-03-18 PROCEDURE — 36415 COLL VENOUS BLD VENIPUNCTURE: CPT

## 2025-03-18 RX ADMIN — Medication 1000 MILLILITER(S): at 10:53

## 2025-03-18 NOTE — ED PROVIDER NOTE - PROGRESS NOTE DETAILS
All results were explained to patient and/or family and a copy of all available results given.  Patient and daughter were informed of the risks and benefits of taking antibiotic.

## 2025-03-18 NOTE — ED ADULT NURSE NOTE - NSFALLHARMRISKINTERV_ED_ALL_ED
Assistance OOB with selected safe patient handling equipment if applicable/Assistance with ambulation/Communicate risk of Fall with Harm to all staff, patient, and family/Monitor gait and stability/Provide visual cue: red socks, yellow wristband, yellow gown, etc/Reinforce activity limits and safety measures with patient and family/Bed in lowest position, wheels locked, appropriate side rails in place/Call bell, personal items and telephone in reach/Instruct patient to call for assistance before getting out of bed/chair/stretcher/Non-slip footwear applied when patient is off stretcher/[place] to call system/Physically safe environment - no spills, clutter or unnecessary equipment/Purposeful Proactive Rounding/Room/bathroom lighting operational, light cord in reach

## 2025-03-18 NOTE — ED ADULT NURSE NOTE - OBJECTIVE STATEMENT
pt axo2,  BIB EMS from [facility], found on floor this morning. hx of dementia, pt states she was found on the floor but cannot recall how she got there. denies pain. safety maintained.

## 2025-03-18 NOTE — ED PROVIDER NOTE - PATIENT PORTAL LINK FT
You can access the FollowMyHealth Patient Portal offered by [facility] by registering at the following website: [email or web address]. By joining [employer]’s FollowMyHealth portal, you will also be able to view your health information using other applications (apps) compatible with our system.

## 2025-03-18 NOTE — ED ADULT TRIAGE NOTE - CHIEF COMPLAINT QUOTE
pt BIB EMS from [facility], found on floor this morning. hx of dementia, BEFAST negative, EMS fingerstick 102

## 2025-03-18 NOTE — ED PROVIDER NOTE - OBJECTIVE STATEMENT
89-year-old female with history of dementia, Depression, GERD, unsteady gait  presents in the ED for being found on the floor today.  Patient was brought in by [facility] from [facility].  Patient is currently on memantine, omeprazole and desvenlafaxine.  Patient denies any headache, dizziness, chest pain, abdominal pain, back pain, weakness or numbness.  Patient wants to go back to the [facility].  No other complaints. 89-year-old female with history of dementia, Depression, GERD, unsteady gait  presents in the ED for being found on the floor today.  Patient was brought in by [facility] from [facility].  Patient is currently on memantine, omeprazole and desvenlafaxine.  Patient denies any headache, dizziness, chest pain, abdominal pain, back pain, weakness or numbness.  Patient wants to go back to the [facility].  No urinary complaints.  No other complaints.

## 2025-04-16 ENCOUNTER — EMERGENCY (EMERGENCY)
Facility: HOSPITAL | Age: 89
LOS: 1 days | End: 2025-04-16
Attending: EMERGENCY MEDICINE | Admitting: EMERGENCY MEDICINE
Payer: MEDICARE

## 2025-04-16 VITALS
WEIGHT: 165.35 LBS | SYSTOLIC BLOOD PRESSURE: 109 MMHG | HEIGHT: 66 IN | HEART RATE: 92 BPM | RESPIRATION RATE: 14 BRPM | DIASTOLIC BLOOD PRESSURE: 84 MMHG | OXYGEN SATURATION: 98 %

## 2025-04-16 VITALS
SYSTOLIC BLOOD PRESSURE: 95 MMHG | DIASTOLIC BLOOD PRESSURE: 67 MMHG | RESPIRATION RATE: 13 BRPM | OXYGEN SATURATION: 95 % | HEART RATE: 81 BPM

## 2025-04-16 LAB
APAP SERPL-MCNC: <1 UG/ML — LOW (ref 10–30)
APPEARANCE UR: ABNORMAL
APTT BLD: 26.9 SEC — SIGNIFICANT CHANGE UP (ref 24.5–35.6)
BACTERIA # UR AUTO: ABNORMAL /HPF
BASE EXCESS BLDA CALC-SCNC: -6.8 MMOL/L — LOW (ref -2–3)
BASOPHILS # BLD AUTO: 0.04 K/UL — SIGNIFICANT CHANGE UP (ref 0–0.2)
BASOPHILS NFR BLD AUTO: 0.3 % — SIGNIFICANT CHANGE UP (ref 0–2)
BILIRUB UR-MCNC: NEGATIVE — SIGNIFICANT CHANGE UP
CO2 BLDA-SCNC: 19 MMOL/L — SIGNIFICANT CHANGE UP (ref 19–24)
COLOR SPEC: YELLOW — SIGNIFICANT CHANGE UP
DIFF PNL FLD: ABNORMAL
EOSINOPHIL # BLD AUTO: 0 K/UL — SIGNIFICANT CHANGE UP (ref 0–0.5)
EOSINOPHIL NFR BLD AUTO: 0 % — SIGNIFICANT CHANGE UP (ref 0–6)
EPI CELLS # UR: PRESENT
ETHANOL SERPL-MCNC: <3 MG/DL — SIGNIFICANT CHANGE UP (ref 0–3)
FLUAV AG NPH QL: SIGNIFICANT CHANGE UP
FLUBV AG NPH QL: SIGNIFICANT CHANGE UP
GAS PNL BLDA: SIGNIFICANT CHANGE UP
GLUCOSE UR QL: NEGATIVE MG/DL — SIGNIFICANT CHANGE UP
HCO3 BLDA-SCNC: 18 MMOL/L — LOW (ref 21–28)
HCT VFR BLD CALC: 38.6 % — SIGNIFICANT CHANGE UP (ref 34.5–45)
HGB BLD-MCNC: 12.5 G/DL — SIGNIFICANT CHANGE UP (ref 11.5–15.5)
HOROWITZ INDEX BLDA+IHG-RTO: 75 — SIGNIFICANT CHANGE UP
IMM GRANULOCYTES NFR BLD AUTO: 0.3 % — SIGNIFICANT CHANGE UP (ref 0–0.9)
INR BLD: 0.94 RATIO — SIGNIFICANT CHANGE UP (ref 0.85–1.16)
KETONES UR-MCNC: ABNORMAL MG/DL
LACTATE SERPL-SCNC: 3.3 MMOL/L — HIGH (ref 0.7–2)
LEUKOCYTE ESTERASE UR-ACNC: NEGATIVE — SIGNIFICANT CHANGE UP
LIDOCAIN IGE QN: 35 U/L — SIGNIFICANT CHANGE UP (ref 16–77)
LYMPHOCYTES # BLD AUTO: 1.12 K/UL — SIGNIFICANT CHANGE UP (ref 1–3.3)
LYMPHOCYTES # BLD AUTO: 7.8 % — LOW (ref 13–44)
MCHC RBC-ENTMCNC: 26.5 PG — LOW (ref 27–34)
MCHC RBC-ENTMCNC: 32.4 G/DL — SIGNIFICANT CHANGE UP (ref 32–36)
MCV RBC AUTO: 81.8 FL — SIGNIFICANT CHANGE UP (ref 80–100)
MONOCYTES # BLD AUTO: 0.87 K/UL — SIGNIFICANT CHANGE UP (ref 0–0.9)
MONOCYTES NFR BLD AUTO: 6 % — SIGNIFICANT CHANGE UP (ref 2–14)
NEUTROPHILS # BLD AUTO: 12.35 K/UL — HIGH (ref 1.8–7.4)
NEUTROPHILS NFR BLD AUTO: 85.6 % — HIGH (ref 43–77)
NITRITE UR-MCNC: NEGATIVE — SIGNIFICANT CHANGE UP
NRBC BLD AUTO-RTO: 0 /100 WBCS — SIGNIFICANT CHANGE UP (ref 0–0)
NT-PROBNP SERPL-SCNC: 2195 PG/ML — HIGH (ref 0–300)
PCO2 BLDA: 30 MMHG — LOW (ref 32–35)
PH BLDA: 7.39 — SIGNIFICANT CHANGE UP (ref 7.35–7.45)
PH UR: 5 — SIGNIFICANT CHANGE UP (ref 5–8)
PLATELET # BLD AUTO: 466 K/UL — HIGH (ref 150–400)
PO2 BLDA: 108 MMHG — SIGNIFICANT CHANGE UP (ref 83–108)
PROT UR-MCNC: 100 MG/DL
PROTHROM AB SERPL-ACNC: 11.1 SEC — SIGNIFICANT CHANGE UP (ref 9.9–13.4)
RBC # BLD: 4.72 M/UL — SIGNIFICANT CHANGE UP (ref 3.8–5.2)
RBC # FLD: 13.6 % — SIGNIFICANT CHANGE UP (ref 10.3–14.5)
RBC CASTS # UR COMP ASSIST: 6 /HPF — HIGH (ref 0–4)
RSV RNA NPH QL NAA+NON-PROBE: SIGNIFICANT CHANGE UP
SALICYLATES SERPL-MCNC: 0.4 MG/DL — LOW (ref 3–30)
SAO2 % BLDA: 99.3 % — HIGH (ref 94–98)
SARS-COV-2 RNA SPEC QL NAA+PROBE: SIGNIFICANT CHANGE UP
SOURCE RESPIRATORY: SIGNIFICANT CHANGE UP
SP GR SPEC: 1.02 — SIGNIFICANT CHANGE UP (ref 1–1.03)
UROBILINOGEN FLD QL: 1 MG/DL — SIGNIFICANT CHANGE UP (ref 0.2–1)
WBC # BLD: 14.42 K/UL — HIGH (ref 3.8–10.5)
WBC # FLD AUTO: 14.42 K/UL — HIGH (ref 3.8–10.5)
WBC UR QL: 1 /HPF — SIGNIFICANT CHANGE UP (ref 0–5)

## 2025-04-16 PROCEDURE — 83880 ASSAY OF NATRIURETIC PEPTIDE: CPT

## 2025-04-16 PROCEDURE — 72125 CT NECK SPINE W/O DYE: CPT | Mod: MC

## 2025-04-16 PROCEDURE — 0241U: CPT

## 2025-04-16 PROCEDURE — 71045 X-RAY EXAM CHEST 1 VIEW: CPT | Mod: 26,77

## 2025-04-16 PROCEDURE — 85025 COMPLETE CBC W/AUTO DIFF WBC: CPT

## 2025-04-16 PROCEDURE — 71275 CT ANGIOGRAPHY CHEST: CPT | Mod: 26

## 2025-04-16 PROCEDURE — 71275 CT ANGIOGRAPHY CHEST: CPT | Mod: MC

## 2025-04-16 PROCEDURE — 81001 URINALYSIS AUTO W/SCOPE: CPT

## 2025-04-16 PROCEDURE — 70450 CT HEAD/BRAIN W/O DYE: CPT | Mod: 26

## 2025-04-16 PROCEDURE — 96361 HYDRATE IV INFUSION ADD-ON: CPT | Mod: XU

## 2025-04-16 PROCEDURE — 84145 PROCALCITONIN (PCT): CPT

## 2025-04-16 PROCEDURE — 74177 CT ABD & PELVIS W/CONTRAST: CPT | Mod: 26

## 2025-04-16 PROCEDURE — 83690 ASSAY OF LIPASE: CPT

## 2025-04-16 PROCEDURE — 31500 INSERT EMERGENCY AIRWAY: CPT

## 2025-04-16 PROCEDURE — 85610 PROTHROMBIN TIME: CPT

## 2025-04-16 PROCEDURE — 93005 ELECTROCARDIOGRAM TRACING: CPT

## 2025-04-16 PROCEDURE — 80053 COMPREHEN METABOLIC PANEL: CPT

## 2025-04-16 PROCEDURE — 99291 CRITICAL CARE FIRST HOUR: CPT | Mod: FT,25

## 2025-04-16 PROCEDURE — 74177 CT ABD & PELVIS W/CONTRAST: CPT | Mod: MC

## 2025-04-16 PROCEDURE — 82962 GLUCOSE BLOOD TEST: CPT

## 2025-04-16 PROCEDURE — 84484 ASSAY OF TROPONIN QUANT: CPT

## 2025-04-16 PROCEDURE — 80307 DRUG TEST PRSMV CHEM ANLYZR: CPT

## 2025-04-16 PROCEDURE — 99291 CRITICAL CARE FIRST HOUR: CPT

## 2025-04-16 PROCEDURE — 93010 ELECTROCARDIOGRAM REPORT: CPT

## 2025-04-16 PROCEDURE — 70450 CT HEAD/BRAIN W/O DYE: CPT | Mod: MC

## 2025-04-16 PROCEDURE — 82803 BLOOD GASES ANY COMBINATION: CPT

## 2025-04-16 PROCEDURE — 96374 THER/PROPH/DIAG INJ IV PUSH: CPT | Mod: XU

## 2025-04-16 PROCEDURE — 87040 BLOOD CULTURE FOR BACTERIA: CPT

## 2025-04-16 PROCEDURE — 87637 SARSCOV2&INF A&B&RSV AMP PRB: CPT

## 2025-04-16 PROCEDURE — 85730 THROMBOPLASTIN TIME PARTIAL: CPT

## 2025-04-16 PROCEDURE — 99292 CRITICAL CARE ADDL 30 MIN: CPT

## 2025-04-16 PROCEDURE — 71045 X-RAY EXAM CHEST 1 VIEW: CPT | Mod: XU

## 2025-04-16 PROCEDURE — 96375 TX/PRO/DX INJ NEW DRUG ADDON: CPT | Mod: XU

## 2025-04-16 PROCEDURE — 36600 WITHDRAWAL OF ARTERIAL BLOOD: CPT

## 2025-04-16 PROCEDURE — 36415 COLL VENOUS BLD VENIPUNCTURE: CPT

## 2025-04-16 PROCEDURE — 83605 ASSAY OF LACTIC ACID: CPT

## 2025-04-16 PROCEDURE — 94760 N-INVAS EAR/PLS OXIMETRY 1: CPT

## 2025-04-16 PROCEDURE — 72125 CT NECK SPINE W/O DYE: CPT | Mod: 26

## 2025-04-16 RX ORDER — VANCOMYCIN HCL IN 5 % DEXTROSE 1.5G/250ML
PLASTIC BAG, INJECTION (ML) INTRAVENOUS
Refills: 0 | Status: COMPLETED | OUTPATIENT
Start: 2025-04-16 | End: 2025-04-16

## 2025-04-16 RX ORDER — FUROSEMIDE 10 MG/ML
20 INJECTION INTRAMUSCULAR; INTRAVENOUS ONCE
Refills: 0 | Status: COMPLETED | OUTPATIENT
Start: 2025-04-16 | End: 2025-04-16

## 2025-04-16 RX ORDER — VANCOMYCIN HCL IN 5 % DEXTROSE 1.5G/250ML
1000 PLASTIC BAG, INJECTION (ML) INTRAVENOUS EVERY 12 HOURS
Refills: 0 | Status: DISCONTINUED | OUTPATIENT
Start: 2025-04-16 | End: 2025-04-16

## 2025-04-16 RX ORDER — SUCCINYLCHOLINE CHLORIDE 20 MG/ML
120 VIAL (ML) INJECTION ONCE
Refills: 0 | Status: COMPLETED | OUTPATIENT
Start: 2025-04-16 | End: 2025-04-16

## 2025-04-16 RX ORDER — ETOMIDATE 2 MG/ML
23 AMPUL (ML) INTRAVENOUS ONCE
Refills: 0 | Status: COMPLETED | OUTPATIENT
Start: 2025-04-16 | End: 2025-04-16

## 2025-04-16 RX ORDER — PROPOFOL 10 MG/ML
5 INJECTION, EMULSION INTRAVENOUS
Qty: 500 | Refills: 0 | Status: ACTIVE | OUTPATIENT
Start: 2025-04-16 | End: 2026-03-15

## 2025-04-16 RX ORDER — NOREPINEPHRINE BITARTRATE 8 MG
0.05 SOLUTION INTRAVENOUS
Qty: 8 | Refills: 0 | Status: ACTIVE | OUTPATIENT
Start: 2025-04-16 | End: 2026-03-15

## 2025-04-16 RX ORDER — VANCOMYCIN HCL IN 5 % DEXTROSE 1.5G/250ML
1000 PLASTIC BAG, INJECTION (ML) INTRAVENOUS ONCE
Refills: 0 | Status: COMPLETED | OUTPATIENT
Start: 2025-04-16 | End: 2025-04-16

## 2025-04-16 RX ORDER — PIPERACILLIN-TAZO-DEXTROSE,ISO 3.375G/5
3.38 IV SOLUTION, PIGGYBACK PREMIX FROZEN(ML) INTRAVENOUS ONCE
Refills: 0 | Status: COMPLETED | OUTPATIENT
Start: 2025-04-16 | End: 2025-04-16

## 2025-04-16 RX ORDER — ACETAMINOPHEN 500 MG/5ML
1000 LIQUID (ML) ORAL ONCE
Refills: 0 | Status: COMPLETED | OUTPATIENT
Start: 2025-04-16 | End: 2025-04-16

## 2025-04-16 RX ORDER — FENTANYL CITRATE-0.9 % NACL/PF 100MCG/2ML
100 SYRINGE (ML) INTRAVENOUS ONCE
Refills: 0 | Status: DISCONTINUED | OUTPATIENT
Start: 2025-04-16 | End: 2025-04-16

## 2025-04-16 RX ORDER — PIPERACILLIN-TAZO-DEXTROSE,ISO 3.375G/5
3.38 IV SOLUTION, PIGGYBACK PREMIX FROZEN(ML) INTRAVENOUS ONCE
Refills: 0 | Status: DISCONTINUED | OUTPATIENT
Start: 2025-04-16 | End: 2025-04-16

## 2025-04-16 RX ADMIN — Medication 1000 MILLIGRAM(S): at 11:26

## 2025-04-16 RX ADMIN — FUROSEMIDE 20 MILLIGRAM(S): 10 INJECTION INTRAMUSCULAR; INTRAVENOUS at 11:56

## 2025-04-16 RX ADMIN — Medication 200 GRAM(S): at 10:56

## 2025-04-16 RX ADMIN — NOREPINEPHRINE BITARTRATE 7.03 MICROGRAM(S)/KG/MIN: 8 SOLUTION at 10:30

## 2025-04-16 RX ADMIN — Medication 100 MICROGRAM(S): at 11:57

## 2025-04-16 RX ADMIN — Medication 120 MILLIGRAM(S): at 09:23

## 2025-04-16 RX ADMIN — Medication 166.67 MILLIGRAM(S): at 10:55

## 2025-04-16 RX ADMIN — Medication 400 MILLIGRAM(S): at 10:56

## 2025-04-16 RX ADMIN — Medication 2300 MILLILITER(S): at 10:56

## 2025-04-16 RX ADMIN — PROPOFOL 2.25 MICROGRAM(S)/KG/MIN: 10 INJECTION, EMULSION INTRAVENOUS at 10:38

## 2025-04-16 RX ADMIN — Medication 23 MILLIGRAM(S): at 09:23

## 2025-04-16 RX ADMIN — Medication 2300 MILLILITER(S): at 12:45

## 2025-04-16 NOTE — DISCHARGE NOTE NURSING/CASE MANAGEMENT/SOCIAL WORK - FINANCIAL ASSISTANCE
[facility] provides services at a reduced cost to those who are determined to be eligible through [facility]’s financial assistance program. Information regarding [facility]’s financial assistance program can be found by going to [email or web address] or by calling [phone].

## 2025-04-16 NOTE — DISCHARGE NOTE NURSING/CASE MANAGEMENT/SOCIAL WORK - PATIENT PORTAL LINK FT
You can access the FollowMyHealth Patient Portal offered by [facility] by registering at the following website: [email or web address]. By joining [employer]’s FollowMyHealth portal, you will also be able to view your health information using other applications (apps) compatible with our system.

## 2025-04-16 NOTE — PATIENT PROFILE ADULT - NSPROIMPLANTSMEDDEV_GEN_A_NUR
Upon review of the In Basket request we were able to locate, review, and update the patient chart as requested for Mammogram     Any additional questions or concerns should be emailed to the Practice Liaisons via the appropriate education email address, please do not reply via In Basket      Thank you  Hal Morillo MA None

## 2025-04-16 NOTE — H&P ADULT - NSHPPHYSICALEXAM_GEN_ALL_CORE
No EO, Left pupil surgical, Right pupil 2R, has corneal reflexes (R>L), no cough/gag. BUE extensor posturing, BLE TF. No EO, Left pupil surgical, Right pupil 2R, has corneal reflexes (R>L), no cough/gag. BUE extensor posturing, BLE TF. GCS 4T.

## 2025-04-16 NOTE — ED ADULT NURSE NOTE - OBJECTIVE STATEMENT
Patient presents to ED BIBA from the [facility] unresponsive, Last known well 9pm last night, patient found on floor this morning with vomit next to patient. Patient hypotensive and hypoxic when EMS arrived. Patient started on 4mg/min levophed by EMS. Patient full code, unconscious, unresponsive, positive pulses and spontaneous respirations. Unable to complete BEFAST due to medical condition.

## 2025-04-16 NOTE — ED ADULT NURSE NOTE - TEMPLATE
Patient presents with:  New Patient: establish care  Physical: annual exam       REASON FOR VISIT:    Luis F Maynard is a 59year old male who presents for an [address] care. Not seen a physician in several years.     Complains PSA    General Health     How would you describe your current health state?: Fair    Type of Diet: Balanced    How do you maintain positive mental well-being?: Social Interaction; Visiting Friends; Visiting Family    How would you describe your daily physica other  risk factors No results found for: A1C  No results found for: GLUCOSE, GLU      Gonorrhea Screening If high risk No results found for: GONOCOCCUS    HIV Screening For all adults age 22-65, older adults at increased risk No results found for: HIV Known Problems Brother       SOCIAL HISTORY:   Social History    Tobacco Use      Smoking status: Never Smoker      Smokeless tobacco: Never Used    Vaping Use      Vaping Use: Never used    Alcohol use:  Yes      Alcohol/week: 4.0 - 6.0 standard drinks varicosities near the ankle bilateral.  NEURO: Oriented times three, cranial nerves are intact, motor and sensory are grossly intact.   Normal gait    ASSESSMENT AND OTHER RELEVANT CHRONIC CONDITIONS:   Key Valdez is a 59year old male who presents for BILATERAL (CPT=93922);  Future  - NEURO - INTERNAL  DDx includes neuropathy, degenerative back disease no back pain, claudication with decreased pulses on lower legs with loss of significant hair-however symptoms are not consistent with classic claudication Respiratory

## 2025-04-16 NOTE — DISCHARGE NOTE NURSING/CASE MANAGEMENT/SOCIAL WORK - NSDCPEFALRISK_GEN_ALL_CORE
For information on Fall & Injury Prevention, visit: [email or web address] OR  [email or web address] OR  https://www.cdc.gov/steadi/patient.html

## 2025-04-16 NOTE — H&P ADULT - HISTORY OF PRESENT ILLNESS
89F, not on AC/AP. PMH dementia, GERD, presented to  from [facility] after being found unresponsive. LKW 4/15 at  9pm. Was intubated at  and txferred to [facility] for neuroICU care.   89F, not on AC/AP. PMH dementia, GERD, presented to  from [facility] after being found unresponsive. LKW 4/15 at  9pm. Was intubated at  and txferred to [facility] for neuroICU care. CTH showed Right basal ganglia IPH w/ IVH extension, ICH score 5.   89F, not on AC/AP. PMH dementia, GERD, presented to  from [facility] after being found unresponsive. LKW 4/15 at  9pm. Was intubated at  and txferred to [facility] for neuroICU care. CTH showed Right basal ganglia IPH w/ IVH extension, ICH score 5. [id number]. GCS 4T.

## 2025-04-16 NOTE — H&P ADULT - ATTENDING COMMENTS
large R BG IPH with IVH, cerebral edema, brain compression, ICH score 5, NIHSS 28  found down at [facility]  daughter HCP, DNR but continue mechanical ventilation at this time  no EO to nox, R pupil reactive 2mm, L pupil surgical NR, +corneal b/l L weaker, no gag, no cough, +overbreathing, BUE ext, BLE TF  Q2 checks  hypertonics for cerebral edema, Na goal 145-150  keppra 500mg Q12 for seizure ppx  pain control, tylenol prn  transition sedation to precedex, minimize as possible  stroke core measures  full vent support  -160  EKG  keep NPO for now  bowel regimen  hyperglycemia-->start insulin drip, once better controlled will transition to subq  monitor for fevers  3%@50, check bmp Q8  hold chemoppx large ICH  SCDs  BLE dopplers pending large R BG IPH with IVH, cerebral edema, brain compression, ICH score 5, NIHSS 28  found down at [facility]  daughter HCP, DNR but continue mechanical ventilation at this time  no EO to nox, R pupil reactive 2mm, L pupil surgical NR, +corneal b/l L weaker, no gag, no cough, +overbreathing, BUE ext, BLE TF  Q2 checks  hypertonics for cerebral edema, Na goal 145-150  keppra 500mg Q12 for seizure ppx  pain control, tylenol prn  transition sedation to precedex, minimize as possible  stroke core measures  full vent support  -160  BP stable for now  elevated trops c/f ischemic event, however given large ICH, not a candidate for antiplatelets and AC  EKG now  cardiology consult   keep NPO for now  bowel regimen  hyperglycemia-->start insulin drip, once better controlled will transition to subq  monitor for fevers  3%@50, check bmp Q8  hold chemoppx large ICH  SCDs  BLE dopplers pending  daughter updated  DNR

## 2025-04-16 NOTE — H&P ADULT - ASSESSMENT
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    NEURO:   - Q1H neurochecks.   - Repeat CTH in AM.   - Keppra 500BID.  Sedation/Pain control:   Activity: [] mobilize as tolerated [] Bedrest [X] PT [X] OT [] PMNR    PULM:  SpO2 goal >92%.   - PRVC 400/14/6/60%.  - CTA Chest w/o PE.    CV:  SBP goal 100-160.  - Lipid panel and TTE results pending.    RENAL:  Fluids: IV 3% NaCl at 50cc/hr  - BMP goal 145-155.     GI:  Diet: NPO   GI prophylaxis [] not indicated [X] PPI [] other:  Bowel regimen [] colace [X] senna [] other: Miralax    ENDO:   Goal euglycemia (-180)  - A1c and thyroid panel results pending.    HEME/ONC:  VTE prophylaxis:   - BLE Dopp pending.   - SCDs    ID:  Monitor for fevers    MISC:    SOCIAL/FAMILY:  [] awaiting [X] updated at bedside [] family meeting    CODE STATUS:  [] Full Code [X] DNR [] DNI [] Palliative/Comfort Care    DISPOSITION:  [X] ICU [] Stroke Unit [] Floor [] EMU [] RCU [] PCU

## 2025-04-16 NOTE — ED ADULT NURSE NOTE - ISAR SCREEN YN
Addendum:  Lab result  Abnormal Result. Hepatic panel  Final ALT level is 71 and this level is [elevated].  Normal reference range is 0 - 50   Resulted after [facility] Emergency Dept visit on this date 8/10/21.    Result was known at time of ED visit copied and pasted from ER visit note 8/10/21:  Blood work today was completely unremarkable except for mild elevation of ALT which has been elevated previously      Maki Avila  [employer] [facility]  Emergency Dept Lab Result RN  Ph# [phone]     Yes

## 2025-04-16 NOTE — CONSULT NOTE ADULT - SUBJECTIVE AND OBJECTIVE BOX
CARDIOLOGY CONSULT - Dr. Delacruz     Patient Name: RIN THOMAS    Date of Service: 04-16-25 @ 17:17    Patient seen and examined    HPI:  89F, not on AC/AP. PMH dementia, GERD, presented to  from [facility] after being found unresponsive. LKW 4/15 at  9pm. Was intubated at  and txferred to [facility] for neuroICU care. CTH showed Right basal ganglia IPH w/ IVH extension, ICH score 5. NIHSS28. GCS 4T.   (16 Apr 2025 13:56)      PAST MEDICAL & SURGICAL HISTORY:  Depression      No significant past surgical history              PREVIOUS DIAGNOSTIC TESTING:    [ ] Echocardiogram:  [ ]  Catheterization:  [ ] Stress Test:  	    MEDICATIONS:  Home Medications:  Pristiq 100 mg oral tablet, extended release: 1 tab(s) orally once a day (13 Sep 2018 13:24)  zolpidem: 5 milligram(s) orally once a day (at bedtime) (13 Sep 2018 13:25)      MEDICATIONS  (STANDING):  chlorhexidine 0.12% Liquid 15 milliLiter(s) Oral Mucosa every 12 hours  chlorhexidine 4% Liquid 1 Application(s) Topical <User Schedule>  dexMEDEtomidine Infusion 0.3 MICROgram(s)/kG/Hr (5.2 mL/Hr) IV Continuous <Continuous>  insulin regular Infusion 2 Unit(s)/Hr (2 mL/Hr) IV Continuous <Continuous>  levETIRAcetam   Injectable 500 milliGRAM(s) IV Push every 12 hours  norepinephrine Infusion 0.05 MICROgram(s)/kG/Min (6.5 mL/Hr) IV Continuous <Continuous>  polyethylene glycol 3350 17 Gram(s) Oral daily  propofol Infusion 10 MICROgram(s)/kG/Min (4.16 mL/Hr) IV Continuous <Continuous>  sodium chloride 3% + sodium acetate 50:50 1000 milliLiter(s) (50 mL/Hr) IV Continuous <Continuous>      FAMILY HISTORY:  Family history of heart disease (Father, Mother)        SOCIAL HISTORY:    [x] Non-smoker  [ ] Smoker  [ ] Alcohol    Allergies    No Known Allergies    Intolerances    	    REVIEW OF SYSTEMS:  CONSTITUTIONAL: No fever, weight loss, or fatigue  EYES: No eye pain, visual disturbances, or discharge  ENMT:  No difficulty hearing, tinnitus, vertigo; No sinus or throat pain  NECK: No pain or stiffness  RESPIRATORY: No cough, wheezing, chills or hemoptysis; No Shortness of Breath  CARDIOVASCULAR: No chest pain, palpitations, passing out, dizziness, or leg swelling  GASTROINTESTINAL: No abdominal or epigastric pain. No nausea, vomiting, or hematemesis; No diarrhea or constipation. No melena or hematochezia.  GENITOURINARY: No dysuria, frequency, hematuria, or incontinence  NEUROLOGICAL: No headaches, memory loss, loss of strength, numbness, or tremors  SKIN: No itching, burning, rashes, or lesions   	    [ ] All others negative	  [x] Unable to obtain    PHYSICAL EXAM:  T(C): 38.2 (04-16-25 @ 17:00), Max: 39.4 (04-16-25 @ 09:21)  HR: 91 (04-16-25 @ 17:00) (81 - 144)  BP: 118/81 (04-16-25 @ 17:00) (66/53 - 176/112)  RR: 22 (04-16-25 @ 17:00) (13 - 27)  SpO2: 100% (04-16-25 @ 17:00) (91% - 100%)  Wt(kg): --  I&O's Summary    16 Apr 2025 07:01  -  16 Apr 2025 17:17  --------------------------------------------------------  IN: 136.6 mL / OUT: 275 mL / NET: -138.4 mL        Appearance: Normal	  Psychiatry: A & O x 3, Mood & affect appropriate  HEENT:   Normal oral mucosa, PERRL, EOMI	  Lymphatic: No lymphadenopathy  Cardiovascular: Normal S1 S2,RRR  Respiratory: intubated  Gastrointestinal:  Soft, Non-tender, + BS	  Skin: No rashes, No ecchymoses, No cyanosis	  Neurologic: Non-focal  Extremities: Normal range of motion, No clubbing, cyanosis or edema  Vascular: Peripheral pulses palpable 2+ bilaterally    TELEMETRY: 	  NSR  ECG:  	not in chart   RADIOLOGY:  < from: CT Head No Cont (04.16.25 @ 11:09) >  IMPRESSION:    CT head: Acute right basal ganglia hemorrhage measuring 4.3 x 2.3 x 2.5   cm with dissection into the lateral ventricles, third ventricle, fourth   ventricle. Moderate to severe hydrocephalus. Right to left midline shift   measures 1 cm. Findings discussed with Dr. Physician:  STACY CANTOR at   11:12 AM.    CT C-spine: Moderate to severe spondylosis. No acute osseous abnormality.   Consider MRI as clinically warranted.      < end of copied text >      < from: CT Angio Chest PE Protocol w/ IV Cont (04.16.25 @ 11:24) >  IMPRESSION:    No acute pulmonary embolism.    Interstitial pulmonary edema.  Bibasilar airspace consolidation which may reflect pneumonia and/or   atelectasis.    No acute intra-abdominal pathology.    Age indeterminate compression deformities L1 and L4.    Other findings as discussed above.    < end of copied text >    < from: Xray Chest 1 View-PORTABLE IMMEDIATE (04.16.25 @ 09:39) >  IMPRESSION: Intubation. Increased symmetric interstitial findings in the   perihilar distributions.    < end of copied text >    < from: VA Duplex Lower Ext Vein Scan, Bilat (04.16.25 @ 15:27) >  IMPRESSION:  No evidence of deep venous thrombosis in either lower extremity.    < end of copied text >    OTHER: 	  	  LABS:	 	    CARDIAC MARKERS:  Troponin T, High Sensitivity Result: 1821 ng/L (04-16 @ 15:39)                                  13.0   18.65 )-----------( 392      ( 16 Apr 2025 14:03 )             42.1     04-16    137  |  102  |  19  ----------------------------<  270[H]  4.1   |  15[L]  |  0.79    Ca    8.0[L]      16 Apr 2025 14:03  Phos  3.6     04-16  Mg     1.8     04-16    TPro  7.3  /  Alb  3.5  /  TBili  0.4  /  DBili  x   /  AST  59[H]  /  ALT  21  /  AlkPhos  95  04-16    PT/INR - ( 16 Apr 2025 09:35 )   PT: 11.1 sec;   INR: 0.94 ratio         PTT - ( 16 Apr 2025 09:35 )  PTT:26.9 sec  proBNP:   Lipid Profile:   HgA1c:   TSH:

## 2025-04-16 NOTE — SPEECH LANGUAGE PATHOLOGY EVALUATION - H & P REVIEW
yes
Detail Level: Zone
Initiate Treatment: Apply to affected areas on neck twice daily until clear. Max two weeks.
Otc Regimen: Gold bond rough and bumpy

## 2025-04-16 NOTE — PATIENT PROFILE ADULT - FALL HARM RISK - HARM RISK INTERVENTIONS

## 2025-04-16 NOTE — ED ADULT NURSE NOTE - CHIEF COMPLAINT QUOTE
Patient presents to ED BIBA from the [facility] unresponsive, Last known well 9pm last night, patient found on floor this morning with vomit next to patient. Patient hypotensive and hypoxic when EMS arrived. Patient started on 4mg/min levophed by EMS. Patient full code, unconscious, unresponsive, positive pulses and spontaneous respirations. Unable to complete BEFAST due to medical condition

## 2025-04-16 NOTE — H&P ADULT - NSHPLABSRESULTS_GEN_ALL_CORE
CTH with CTH with Right basal ganglia IPH w/ extension into Right lateral ventricle, third ventricle, cerebral aqueduct and fourth ventricle. Third ventricle, cerebral aqueduct, and fourth ventricles casted.    CT Chest w/o PE.

## 2025-04-16 NOTE — ED ADULT TRIAGE NOTE - CHIEF COMPLAINT QUOTE
Patient presents to ED BIBA from the [facility] unresponsive, Last known well 9pm last night, patient found on floor this morning with vomit next to patient. Patient hypotensive and hypoxic when EMS arrived. Patient started on 4mg/min levophed by EMS. Patient full code, unconscious, unresponsive, positive pulses and spontaneous respirations. Unable to complete BEFAST due to medical condition Patient presents to ED BIBA from the [facility] unresponsive, Last known well 9pm last night, patient found on floor this morning with vomit next to patient. Patient hypotensive and hypoxic when EMS arrived. Patient started on 4mcg/min levophed by EMS. Patient full code, unconscious, unresponsive, positive pulses and spontaneous respirations. Unable to complete BEFAST due to medical condition

## 2025-04-16 NOTE — CHART NOTE - NSCHARTNOTEFT_GEN_A_CORE
Patient made comfort care earlier this evening at approximately 5PM. Comfort measures initiated, and patient does not appear to be in any distress.
CAPRINI SCORE [CLOT] Score on Admission for     AGE RELATED RISK FACTORS                                                       MOBILITY RELATED FACTORS  [ ] Age 41-60 years                                            (1 Point)                  [ ] Bed rest                                                        (1 Point)  [ ] Age: 61-74 years                                           (2 Points)                 [ ] Plaster cast                                                   (2 Points)  [x ] Age= 75 years                                              (3 Points)                 [ ] Bed bound for more than 72 hours                 (2 Points)    DISEASE RELATED RISK FACTORS                                               GENDER SPECIFIC FACTORS  [ ] Edema in the lower extremities                       (1 Point)                  [ ] Pregnancy                                                     (1 Point)  [ ] Varicose veins                                               (1 Point)                  [ ] Post-partum < 6 weeks                                   (1 Point)             [ x] BMI > 25 Kg/m2                                            (1 Point)                  [ ] Hormonal therapy  or oral contraception          (1 Point)                 [ ] Sepsis (in the previous month)                        (1 Point)                  [ ] History of pregnancy complications                 (1 point)  [ ] Pneumonia or serious lung disease                                               [ ] Unexplained or recurrent                     (1 Point)           (in the previous month)                               (1 Point)  [ ] Abnormal pulmonary function test                     (1 Point)                 SURGERY RELATED RISK FACTORS (include planned surgeries)  [ ] Acute myocardial infarction                              (1 Point)                 [ ]  Section                                             (1 Point)  [ ] Congestive heart failure (in the previous month)  (1 Point)         [ ] Minor surgery                                                  (1 Point)   [ ] Inflammatory bowel disease                             (1 Point)                 [ ] Arthroscopic surgery                                        (2 Points)  [ ] Central venous access                                      (2 Points)                [ ] General surgery lasting more than 45 minutes   (2 Points)       [x ] Stroke (in the previous month)                          (5 Points)               [ ] Elective arthroplasty                                         (5 Points)            [ ] current or past malignancy                              (2 Points)                                                                                                       HEMATOLOGY RELATED FACTORS                                                 TRAUMA RELATED RISK FACTORS  [ ] Prior episodes of VTE                                     (3 Points)                [ ] Fracture of the hip, pelvis, or leg                       (5 Points)  [ ] Positive family history for VTE                         (3 Points)                 [ ] Acute spinal cord injury (in the previous month)  (5 Points)  [ ] Prothrombin 97908 A                                     (3 Points)                 [ ] Paralysis  (less than 1 month)                             (5 Points)  [ ] Factor V Leiden                                             (3 Points)                  [ ] Multiple Trauma within 1 month                        (5 Points)  [ ] Lupus anticoagulants                                     (3 Points)                                                           [ ] Anticardiolipin antibodies                               (3 Points)                                                       [ ] High homocysteine in the blood                      (3 Points)                                             [ ] Other congenital or acquired thrombophilia      (3 Points)                                                [ ] Heparin induced thrombocytopenia                  (3 Points)                                          Total Score [    9      ]    Risk:  Very low 0   Low 1 to 2   Moderate 3 to 4   High =5       VTE Prophylasix Recommednations:  [x ] mechanical pneumatic compression devices                                      [ ] contraindicated: _____________________  [ ] chemo prophylasix                                                                                   [ x] contraindicated _____________________    **** HIGH LIKELIHOOD DVT PRESENT ON ADMISSION  [ ] (please order LE dopplers within 24 hours of admission)

## 2025-04-16 NOTE — CONSULT NOTE ADULT - SUBJECTIVE AND OBJECTIVE BOX
p (1480)     HPI: 89F DNR h/o dementia, nursing home resident found in bed unresponsive this AM. Plts/coags WNL. CTH w/ [id number] right basal ganglia IPH w/ IVH, 3rd and 4th casted, ventriculomegaly w/mod transependymal flow.      Exam: Intubated, no EO, right pupil 2slugish, left surgical, (+) corneals/OB, no cough/gag, BUE extend, BLE TF.     ICH score 5.       =====================  PAST MEDICAL HISTORY   Depression      PAST SURGICAL HISTORY   No significant past surgical history      No Known Allergies      MEDICATIONS:  Antibiotics:    Neuro:  acetaminophen     Tablet .. 650 milliGRAM(s) Oral every 6 hours PRN  dexMEDEtomidine Infusion 0.3 MICROgram(s)/kG/Hr IV Continuous <Continuous>  levETIRAcetam   Injectable 500 milliGRAM(s) IV Push every 12 hours  propofol Infusion 10 MICROgram(s)/kG/Min IV Continuous <Continuous>    Other:  dextrose 50% Injectable 50 milliLiter(s) IV Push every 15 minutes  dextrose 50% Injectable 25 milliLiter(s) IV Push every 15 minutes  insulin regular Infusion 2 Unit(s)/Hr IV Continuous <Continuous>  norepinephrine Infusion 0.05 MICROgram(s)/kG/Min IV Continuous <Continuous>  polyethylene glycol 3350 17 Gram(s) Oral daily  senna 2 Tablet(s) Oral at bedtime PRN  sodium chloride 3% + sodium acetate 50:50 1000 milliLiter(s) IV Continuous <Continuous>      SOCIAL HISTORY:   Occupation:   Marital Status:     FAMILY HISTORY:  Family history of heart disease (Father, Mother)        ROS: Negative except per HPI    LABS:  PT/INR - ( 16 Apr 2025 09:35 )   PT: 11.1 sec;   INR: 0.94 ratio         PTT - ( 16 Apr 2025 09:35 )  PTT:26.9 sec                        13.0   18.65 )-----------( 392      ( 16 Apr 2025 14:03 )             42.1     04-16    137  |  102  |  19  ----------------------------<  270[H]  4.1   |  15[L]  |  0.79    Ca    8.0[L]      16 Apr 2025 14:03  Phos  3.6     04-16  Mg     1.8     04-16    TPro  7.3  /  Alb  3.5  /  TBili  0.4  /  DBili  x   /  AST  59[H]  /  ALT  21  /  AlkPhos  95  04-16

## 2025-04-16 NOTE — CONSULT NOTE ADULT - TIME BILLING
agree with above  89F, not on AC/AP. PMH dementia, GERD, presented to  from [facility] after being found unresponsive. LKW 4/15 at  9pm. Was intubated at  and txferred to [facility] for neuroICU care. CTH showed Right basal ganglia IPH w/ IVH extension.     #Acute right basal ganglia hemorrhage  -CT Head shows Acute right basal ganglia hemorrhage measuring 4.3 x 2.3 x 2.5 cm with dissection into the lateral ventricles, third ventricle, fourth ventricle. Moderate to severe hydrocephalus. Right to left midline shift measures 1 cm.   -CTA Chest with no PE  -Duplex b/l shows no DVT  -Elevated HST noted   -TTE pending  -intubated, poor prognosis, no acute intervention planned   -bp parameters as per neuro  -no neurosurgical intervention per neurosx   -palliative consult   -GOC per family/ICU    care per NSCU

## 2025-04-16 NOTE — ED PROVIDER NOTE - PHYSICAL EXAMINATION
Constitutional: acute distress, gurgling, unresponsive GCS 6 (E1V1M4)  Eyes: EOMI, pupils equal round reactive to light bilaterally, no nystagmus  Head: Normocephalic atraumatic  Mouth: gurgling, dry vomitus in hair and on shirt  Cardiac: tachycardic, regular rhtyhm  Resp: bilateral rhonchi  GI: Abd s/nt/nd  Neuro: not following commands  Skin: No rashes Constitutional: acute distress, gurgling, unresponsive GCS 6 (E1V1M4)  Eyes: EOMI, pupils equal round reactive to light bilaterally, no nystagmus, not responding to threat  Head: Normocephalic atraumatic  Mouth: gurgling, dry vomitus in hair and on shirt  Cardiac: tachycardic, regular rhythm  Resp: bilateral rhonchi  GI: Abd s/nt/nd  Neuro: not following commands  Skin: No rashes, no lacerations

## 2025-04-16 NOTE — PATIENT PROFILE ADULT - FUNCTIONAL ASSESSMENT - DAILY ACTIVITY SCORE.
18 Epidermal Autograft Text: The defect edges were debeveled with a #15 scalpel blade.  Given the location of the defect, shape of the defect and the proximity to free margins an epidermal autograft was deemed most appropriate.  Using a sterile surgical marker, the primary defect shape was transferred to the donor site. The epidermal graft was then harvested.  The skin graft was then placed in the primary defect and oriented appropriately.

## 2025-04-16 NOTE — ED PROVIDER NOTE - OBJECTIVE STATEMENT
89yoF with dementia, GERD, coming from [facility] after being found unresponsive.Per EMS, patient was unresponsive this morning, unknown last known well.  Had normal glucose.  Levophed was started for hypotension.     Per daughterSera at bedside: [facility] morningTo inform her that patient was found in bed unresponsive 89yoF with dementia, GERD, coming from [facility] after being found unresponsive. Per EMS, patient was unresponsive this morning, unknown last known well despite triage note saying LKW of 9pm.  Had normal glucose. on ems arrival, pt hypotensive and hypoxic, arrives with NRB, levophed.    Per daughterSera at bedside: [facility] called her this morningTo inform her that patient was found in bed unresponsive 89yoF with dementia, GERD, coming from [facility] after being found unresponsive. Per EMS, patient was unresponsive on the floor this morning, unknown last known well despite triage note saying LKW of 9pm.  Had normal glucose. on ems arrival, pt hypotensive and hypoxic, arrives with NRB, levophed.    Per daughterSera at bedside: [facility] called her this morning to inform her that patient was found in bed unresponsive

## 2025-04-16 NOTE — ED PROVIDER NOTE - CARE PLAN
Principal Discharge DX:	Hemorrhage in the brain  Secondary Diagnosis:	Unresponsive  Secondary Diagnosis:	GONZALEZ (acute kidney injury)   1 Principal Discharge DX:	Hemorrhage in the brain  Secondary Diagnosis:	Unresponsive  Secondary Diagnosis:	GONZALEZ (acute kidney injury)  Secondary Diagnosis:	Acute pulmonary edema

## 2025-04-16 NOTE — DISCHARGE NOTE NURSING/CASE MANAGEMENT/SOCIAL WORK - NSDCVIVACCINE_GEN_ALL_CORE_FT
influenza, injectable, quadrivalent, preservative free; 12-Sep-2018 22:12; Sohail Higgins (RN); [employer]; [id number] (Exp. Date: 30-Jun-2018); IntraMuscular; Deltoid Right.; 0.5 milliLiter(s); VIS (VIS Published: 07-Aug-2015, VIS Presented: 12-Sep-2018);

## 2025-04-16 NOTE — SPEECH LANGUAGE PATHOLOGY EVALUATION - SLP DIAGNOSIS
Consult received and appreciated. Pt is intubated at this time. Will defer evaluation when Pt is medically appropriate pending extubation. Consult received and appreciated. Pt is intubated at this time. Will defer evaluation when Pt is medically appropriate pending extubation. D/w DILIA Jorgensen, in agreement.

## 2025-04-16 NOTE — ED PROVIDER NOTE - CLINICAL SUMMARY MEDICAL DECISION MAKING FREE TEXT BOX
intubated on ED arrival for airway protection  rectal temp of 103F.  full septic workup, 30cc/kg IVFs, vanco, zosyn, CTH, CT C spine, CT C/A/P intubated on ED arrival for airway protection  rectal temp of 103F.  full septic workup, 30cc/kg IVFs, vanco, zosyn, CTH, CT C spine, CT C/A/P    basal ganglia hemorrhage on CTH.    case discussed w. cardiology attending on call, Dr. Traylor; transfer pt for elevated troponin  case discussed w/ transfer center/neurosurgery ICU attending Dr. Myriam mcduffie; accepted to their service. intubated on ED arrival for airway protection w/ etomidate and succinylcholine. Propofol for sedation. arrived on levophed which was continued in the ED,  rectal temp of 103F; ofirmev given.  full septic workup, 30cc/kg IVFs, vanco, zosyn, CTH, CT C spine, CT C/A/P    basal ganglia hemorrhage on CTH. (no AC or AP use).  pulmonary edema on CTA PE study; lasix 20mg given.  initial lactate elevated, repeat not obtained as transport arrived PTA.    case discussed w. cardiology attending on call, Dr. Traylor; transfer pt for elevated troponin  case discussed w/ transfer center/neurosurgery ICU attending Dr. Myriam mcduffie; transfer to [facility] ICU; accepted to their service.  pt's daughter who is an RN at bedside informed of all findings, agreeable to transfer. intubated on ED arrival for airway protection w/ etomidate and succinylcholine. Propofol for sedation. arrived on levophed which was continued in the ED,  rectal temp of 103F; ofirmev given.  full septic workup, 30cc/kg IVFs, vanco, zosyn, CTH, CT C spine, CT C/A/P    ECG: NSR 96 bpm, L axis, QTc 502 ms, other intervals normal, no stemi, similar to last ecg in our system.    basal ganglia hemorrhage on CTH. (no AC or AP use); discussed w/ transfer center/neurosurgery ICU attending Dr. Myriam mcduffie; transfer to [facility] ICU; accepted to their service.  pulmonary edema on CTA PE study, BNP of 2195; Lasix 20mg given  Trop of 05223; ECG without ischemia; discussed w. cardiology attending on call, pictures of old and new ECG sent to him via teams, Dr. Nathan Bishop; transfer pt for elevated troponin. states to give ASA and heparin; will hold at this time given basal ganglia hemorrhage.    initial lactate elevated, repeat not obtained as transport arrived prior to.    pt's daughter who is an RN at bedside informed of all findings, agreeable to transfer.

## 2025-04-16 NOTE — SPEECH LANGUAGE PATHOLOGY EVALUATION - SLP PERTINENT HISTORY OF CURRENT PROBLEM
Please avoid putting any pressure on the wound and apply cool pack to it for a 10-15 minutes every hour.      
89F, not on AC/AP. PMH dementia, GERD, presented to  from [facility] after being found unresponsive. LKW 4/15 at  9pm. Was intubated at  and txferred to [facility] for neuroICU care. Difficulty swallowing liquids/foods; NGT in place. Prominent bilateral interlobular septal thickening with patchy dependent. CT HEAD: Acute right basal ganglia hemorrhage measuring 4.3 x 2.3 x 2.5 cm with dissection into the lateral ventricles, third ventricle, fourth ventricle. Moderate to severe hydrocephalus

## 2025-04-16 NOTE — ED ADULT NURSE REASSESSMENT NOTE - NS ED NURSE REASSESS COMMENT FT1
pt on arrival unable to maintain their airway, unresponsive. RSI medications obtain for intubation by MD Bunn- 20 etomidate, 120 succinylcholine. pt intubated with 7ETT, 22 at the lip line.

## 2025-04-16 NOTE — PATIENT PROFILE ADULT - NS PRO AD ANY ON CHART
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]                   Central Line Insertion  Date/Time: 8/31/2016 7:03 AM  Performed by: Gabrielle Tapia  Authorized by: Gabrielle Tapia     Patient location:  Bedside  Consent:     Consent obtained:  Verbal and emergent situation    Consent given by:  Patient    Risks discussed:  Arterial puncture, incorrect placement, nerve damage, pneumothorax and bleeding    Alternatives discussed:  No treatment  Universal protocol:     Procedure explained and questions answered to patient or proxy's satisfaction: yes      Relevant documents present and verified: yes      Test results available and properly labeled: yes       Imaging studies available: yes      Required blood products, implants, devices, and special equipment available: yes      Site/side marked: yes      Immediately prior to procedure, a time out was called: yes      Patient identity confirmed:  Arm band and verbally with patient  Pre-procedure details:     Hand hygiene: Hand hygiene performed prior to insertion      Sterile barrier technique: All elements of maximal sterile technique followed      Skin preparation:  2% chlorhexidine  Indications:     Central line indications: hemodynamic monitoring and vascular access    Anesthesia (see MAR for exact dosages):      Anesthesia method:  Local infiltration    Local anesthetic:  Lidocaine 1% w/o epi  Procedure details:     Location:  Right internal jugular    Vessel type: vein      Laterality: Right    Approach: percutaneous technique used      Patient position:  Trendelenburg    Catheter type:  Triple lumen 16cm    Catheter size:  7 Fr    Landmarks identified: yes      Ultrasound guidance: yes      Sterile ultrasound techniques: Sterile gel and sterile probe covers were used      Number of attempts:  3    Successful placement: yes    Post-procedure details:     Post-procedure:  Dressing applied and line sutured    Assessment:  Blood return through all ports and free fluid flow    Post-procedure complications: none                       Received for:Provider  EPIC   Aug 31 2016 10:15AM [facility] Standard Time Yes

## 2025-04-16 NOTE — ED ADULT NURSE NOTE - NSFALLHARMRISKINTERV_ED_ALL_ED

## 2025-04-17 NOTE — CONSULT NOTE ADULT - PROBLEM SELECTOR RECOMMENDATION 9
pt s/p compassionate extubation 4/16.  Pt required multiple PRNs of IV dilaudid 0.5mg. On exam pt remains tachypneic with labored breathing.    Recommend:  Start IV dilaudid 1mg q6h ATC  Start IV dilaudid 1.5mg q1h PRN dyspnea  bowel regimen

## 2025-04-17 NOTE — DISCHARGE NOTE FOR THE EXPIRED PATIENT - HOSPITAL COURSE
89 y/o F hx dementia, GERD, presented to [facility] after being found unresponsive. LKW 4/15 at  9pm. Was intubated at  and txferred to [facility] for neuroICU care. CTH showed Right basal ganglia IPH w/ IVH extension, ICH score 5. Pt compassionately extubated in NSCU on . Pt pending transfer to PCU for symptom management and end of life care.     Patient pronounced  by Primary RN Rody on 25 at 15:55. RN to notify next of kin.     Medical Examiner consulted, case reviewed with Sav Bautista and deemed not to be an ME Case. Spoke with Prisca from admitting and death certificate reviewed and accepted. 89 y/o F hx dementia, GERD, presented to [facility] from [facility] after being found unresponsive. LKW 4/15 at  9pm. Was intubated at  and txferred to [facility] for neuroICU care. CTH showed Right basal ganglia IPH w/ IVH extension, ICH score 5. Pt compassionately extubated in NSCU on . Pt pending transfer to PCU for symptom management and end of life care.     Patient pronounced  by Primary RN Rody on 25 at 15:55. RN to notify next of kin.     Medical Examiner consulted, case reviewed with Sav Bautista and deemed not to be an ME Case. Spoke with Prisca from admitting and death certificate reviewed and accepted. Confirmed with Josi from admitting that decedent had been admitted for greater than 24hrs prior to expiration. 89 y/o F hx dementia, GERD, presented [facility] from [facility] after being found unresponsive. LKW 4/15 at  9pm. Was intubated at  and txferred to [facility] for neuroICU care. CTH showed Right basal ganglia IPH w/ IVH extension, ICH score 5. Pt compassionately extubated in NSCU on . Pt transferred to PCU for symptom management and end of life care.     Patient pronounced  by Primary RN Rody on 25 at 15:55. RN to notify next of kin.     Medical Examiner consulted, case reviewed with Sav Bautista and deemed not to be an ME Case. Spoke with Prisca from admitting and death certificate reviewed and accepted. Confirmed with Josi from admitting that decedent had been admitted for greater than 24hrs prior to expiration.

## 2025-04-17 NOTE — CONSULT NOTE ADULT - PROBLEM SELECTOR RECOMMENDATION 5
Pt pending transfer to PCU for symptom management and end of life care. Further disposition to be guided by clinical course.  Case discussed with NSCU team.    For acute issues or uncontrolled symptoms please page palliative team.    Sudha Barreto MD  Geriatrics and Palliative Medicine Attending  [facility] pager: [phone]

## 2025-04-17 NOTE — CONSULT NOTE ADULT - CONVERSATION DETAILS
Met with patient's daughter and grandchildren at bedside. Palliative team introduced and role in patient's care explained. Sera identified as surrogate [facility]. She has good understanding of patient's condition and confirmed goals are for comfort measures. She is agreeable to transfer to PCU for symptom management and end of life care. Chaplaincy offered but deferred at this time. All questions answered and support provided.

## 2025-04-17 NOTE — PROGRESS NOTE ADULT - SUBJECTIVE AND OBJECTIVE BOX
SUMMARY:        OVERNIGHT EVENTS:     HOSPITAL COURSE    ADMISSION SCORES:   GCS: 15 HH: MF: NIHSS: ICH Score:    REVIEW OF SYSTEMS: [] Unable to Assess due to neurologic exam   [ ] All ROS addressed below are non-contributory, except:  Neuro: [ ] Headache [ ] Back pain [ ] Numbness [ ] Weakness [ ] Ataxia [ ] Dizziness [ ] Aphasia [ ] Dysarthria [ ] Visual disturbance  Resp: [ ] Shortness of breath/dyspnea [ ] Orthopnea [ ] Cough  CV: [ ] Chest pain [ ] Palpitation [ ] Lightheadedness [ ] Syncope  Renal: [ ] Thirst [ ] Edema  GI: [ ] Nausea [ ] Emesis [ ] Abdominal pain [ ] Constipation [ ] Diarrhea  Hem: [ ] Hematemesis [ ] bBright red blood per rectum  ID: [ ] Fever [ ] Chills [ ] Dysuria  ENT: [ ] Rhinorrhea    ----------------------------------------------------------------------------------------------------  PHYSICAL EXAM:    Constitutional: No Acute Distress   Neurological: No RAOUL, not following commands  Pulmonary: Clear to Auscultation, No rales, No rhonchi, No wheezes   Cardiovascular: S1, S2, Regular rate and rhythm   Gastrointestinal: Soft, Non-distended     -----------------------------------------------------------------------------------------------------  VITALS:   T(C): 36.8 (04-16-25 @ 19:00), Max: 39.4 (04-16-25 @ 09:21)  HR: 100 (04-16-25 @ 19:50) (78 - 144)  BP: 74/56 (04-16-25 @ 19:00) (66/53 - 176/112)  RR: 23 (04-16-25 @ 19:00) (13 - 27)  SpO2: 100% (04-16-25 @ 19:50) (91% - 100%)    04-16-25 @ 07:01  -  04-16-25 @ 23:30  --------------------------------------------------------  IN: 352 mL / OUT: 415 mL / NET: -63 mL        MEDICATIONS  (STANDING):  levETIRAcetam   Injectable 500 milliGRAM(s) IV Push every 12 hours    RESPIRATORY:  Mode: AC/ CMV (Assist Control/ Continuous Mandatory Ventilation)  RR (machine): 14  TV (machine): 400  FiO2: 60  PEEP: 6  ITime: 1  MAP: 10  PIP: 14    IMAGING:   Recent imaging studies were reviewed.    LAB RESULTS:                          13.0   18.65 )-----------( 392      ( 16 Apr 2025 14:03 )             42.1       PT/INR - ( 16 Apr 2025 09:35 )   PT: 11.1 sec;   INR: 0.94 ratio         PTT - ( 16 Apr 2025 09:35 )  PTT:26.9 sec    04-16    137  |  102  |  19  ----------------------------<  270[H]  4.1   |  15[L]  |  0.79    Ca    8.0[L]      16 Apr 2025 14:03  Phos  3.6     04-16  Mg     1.8     04-16    TPro  7.3  /  Alb  3.5  /  TBili  0.4  /  DBili  x   /  AST  59[H]  /  ALT  21  /  AlkPhos  95  04-16      ABG - ( 16 Apr 2025 13:52 )  pH, Arterial: 7.36  pH, Blood: x     /  pCO2: 28    /  pO2: 121   / HCO3: 16    / Base Excess: -8.3  /  SaO2: 99.4                -----------------------------------------------------------------------------------------------------------------------------------------------------------------------------------  ALLERGIES: Allergies    No Known Allergies    Intolerances    
SUMMARY:  89F, not on AC/AP. PMH dementia, GERD, presented to  from [facility] after being found unresponsive. LKW 4/15 at  9pm. Was intubated at  and txferred to [facility] for neuroICU care. CTH showed Right basal ganglia IPH w/ IVH extension, ICH score 5. NIHSS28. GCS 4T.    ICU COURSE:  4/17: admitted to NSCU.    ADMISSION SCORES:   GCS: 4T       NIHSS: 28            ICH Score:   5    REVIEW OF SYSTEMS: None other than stated in HPI    ALLERGIES: Allergies    No Known Allergies    Intolerances        VITALS/DATA/ORDERS:    T(C): 38.6 (04-17-25 @ 07:00), Max: 39.4 (04-16-25 @ 09:21)  HR: 131 (04-17-25 @ 07:00) (78 - 144)  BP: 112/63 (04-17-25 @ 07:00) (66/53 - 176/112)  RR: 28 (04-17-25 @ 07:00) (13 - 28)  SpO2: 81% (04-17-25 @ 07:00) (81% - 100%)                          13.0   18.65 )-----------( 392      ( 16 Apr 2025 14:03 )             42.1       04-16    137  |  102  |  19  ----------------------------<  270[H]  4.1   |  15[L]  |  0.79    Ca    8.0[L]      16 Apr 2025 14:03  Phos  3.6     04-16  Mg     1.8     04-16    TPro  7.3  /  Alb  3.5  /  TBili  0.4  /  DBili  x   /  AST  59[H]  /  ALT  21  /  AlkPhos  95  04-16          ABG - ( 16 Apr 2025 13:52 )  pH, Arterial: 7.36  pH, Blood: x     /  pCO2: 28    /  pO2: 121   / HCO3: 16    / Base Excess: -8.3  /  SaO2: 99.4                PT/INR - ( 16 Apr 2025 09:35 )   PT: 11.1 sec;   INR: 0.94 ratio         PTT - ( 16 Apr 2025 09:35 )  PTT:26.9 sec            04-16-25 @ 07:01  -  04-17-25 @ 07:00  --------------------------------------------------------  IN: 352 mL / OUT: 815 mL / NET: -463 mL        acetaminophen     Tablet .. 650 milliGRAM(s) Oral every 6 hours PRN  glycopyrrolate Injectable 0.4 milliGRAM(s) IV Push four times a day PRN  HYDROmorphone  Injectable 0.5 milliGRAM(s) IV Push every 2 hours PRN  levETIRAcetam   Injectable 500 milliGRAM(s) IV Push every 12 hours  LORazepam   Injectable 1 milliGRAM(s) IV Push every 4 hours PRN  ondansetron Injectable 4 milliGRAM(s) IV Push every 6 hours PRN      EXAMINATION:  General: No acute distress  HEENT: Anicteric sclerae  Cardiac: [id number]  Lungs: Clear  Abdomen: Soft, non-tender, +BS  Extremities: No c/c/e  Skin/Incision Site: Clean, dry and intact  Neurologic: Awake, alert, fully oriented, follows commands, PERRL, VFFtc, EOMI, face symmetric, tongue midline, no drift, full strength

## 2025-04-17 NOTE — CONSULT NOTE ADULT - SUBJECTIVE AND OBJECTIVE BOX
Date of Service: 04-17-25 @ 11:06    HPI:  89F, not on AC/AP. PMH dementia, GERD, presented to  from [facility] senior living after being found unresponsive. LKW 4/15 at  9pm. Was intubated at  and txferred to [facility] for neuroICU care. CTH showed Right basal ganglia IPH w/ IVH extension, ICH score 5. NIHSS28. GCS 4T.   (16 Apr 2025 13:56)    PERTINENT PM/SXH:   Depression      No significant past surgical history      FAMILY HISTORY:  Family history of heart disease (Father, Mother)        SOCIAL HISTORY:   Significant other/partner[ ]  Children[ ]  Latter-day/Spirituality:  Substance hx:  [ ]   Tobacco hx:  [ ]   Alcohol hx: [ ]   Home Opioid hx:  [ ] I-Stop Reference No:  Living Situation: [ ]Home  [ ]Long term care  [ ]Rehab [ ]Other    ADVANCE DIRECTIVES:    DNR/MOLST  [ ]  Living Will  [ ]   DECISION MAKER(s):  [ ] Health Care Proxy(s)  [ ] Surrogate(s)  [ ] Guardian           Name(s): Phone Number(s):    BASELINE (I)ADL(s) (prior to admission):  [place]: [ ]Total  [ ] Moderate [ ]Dependent    Allergies    No Known Allergies    Intolerances    MEDICATIONS  (STANDING):  HYDROmorphone  Injectable 1 milliGRAM(s) IV Push every 6 hours  levETIRAcetam   Injectable 500 milliGRAM(s) IV Push every 12 hours    MEDICATIONS  (PRN):  acetaminophen     Tablet .. 650 milliGRAM(s) Oral every 6 hours PRN Temp greater or equal to 38C (100.4F), Mild Pain (1 - 3), Moderate Pain (4 - 6), Severe Pain (7 - 10)  glycopyrrolate Injectable 0.4 milliGRAM(s) IV Push four times a day PRN Secretions  HYDROmorphone  Injectable 1.5 milliGRAM(s) IV Push every 1 hour PRN Severe Pain (7 - 10)  HYDROmorphone  Injectable 1.5 milliGRAM(s) IV Push every 1 hour PRN dyspnea  LORazepam   Injectable 1 milliGRAM(s) IV Push every 4 hours PRN Anxiety  ondansetron Injectable 4 milliGRAM(s) IV Push every 6 hours PRN Nausea and/or Vomiting      ITEMS NOT CHECKED ARE NOT PRESENT  PRESENT SYMPTOMS: [ ]Unable to self-report see CPOT, PAINADs, RDOS  Source if other than patient:  [ ]Family   [ ]Team     Pain: [ ]yes [ ]no  QOL impact -   Location -                    Aggravating factors -  Quality -  Radiation -  Timing-  Severity (0-10 scale):  Minimal acceptable level (0-10 scale):       Dyspnea:                           [ ]Mild [ ]Moderate [ ]Severe  Anxiety:                             [ ]Mild [ ]Moderate [ ]Severe  Fatigue:                             [ ]Mild [ ]Moderate [ ]Severe  Nausea:                             [ ]Mild [ ]Moderate [ ]Severe  Loss of appetite:              [ ]Mild [ ]Moderate [ ]Severe  Constipation:                    [ ]Mild [ ]Moderate [ ]Severe    PCSSQ [Palliative Care Spiritual Screening Question]   Severity (0-10):  Score of 4 or > indicate consideration of Chaplaincy referral.  Chaplaincy Referral: [ ] yes [ ] refused [ ] following [ ] deferred    Caregiver [place]? : [ ] yes [ ] no [ ] deferred:  Social work referral [ ] Patient & Family Centered Care Referral [ ]     Anticipatory Grief Present?: [ ] yes [ ] no  [ ] deferred: Palliative Social work referral [ ]  Patient & Family Centered Care Referral [ ]       Other Symptoms:  [ ]All other review of systems negative   [ ] Unable to obtain due to poor mentation    PHYSICAL EXAM:  Vital Signs Last 24 Hrs  T(C): 38.6 (17 Apr 2025 07:00), Max: 38.9 (17 Apr 2025 05:24)  T(F): 101.5 (17 Apr 2025 07:00), Max: 102 (17 Apr 2025 05:24)  HR: 131 (17 Apr 2025 07:00) (78 - 131)  BP: 112/63 (17 Apr 2025 07:00) (66/53 - 133/79)  BP(mean): 82 (17 Apr 2025 07:00) (58 - 100)  RR: 28 (17 Apr 2025 07:00) (13 - 28)  SpO2: 81% (17 Apr 2025 07:00) (81% - 100%)    Parameters below as of 17 Apr 2025 07:00  Patient On (Oxygen Delivery Method): room air     I&O's Summary    16 Apr 2025 07:01  -  17 Apr 2025 07:00  --------------------------------------------------------  IN: 352 mL / OUT: 815 mL / NET: -463 mL        GENERAL:  [x]Alert  [x]Oriented x 3  [ ]Lethargic  [ ]Cachexia  [ ]Unarousable  [x]Verbal  [ ]Non-Verbal  Behavioral:   [ ]Anxiety  [ ]Delirium [ ]Agitation [ ]Other  HEENT:  [x]Normal   [ ]Dry mouth   [ ]ET Tube/Trach  [ ]Oral lesions  PULMONARY:   [x]Clear [ ]Tachypnea  [ ]Audible excessive secretions   [ ]Rhonchi        [ ]Right [ ]Left [ ]Bilateral  [ ]Crackles        [ ]Right [ ]Left [ ]Bilateral  [ ]Wheezing     [ ]Right [ ]Left [ ]Bilateral  [ ]Diminished BS [ ] Right [ ]Left [ ]Bilateral  CARDIOVASCULAR:    [x]Regular [ ]Irregular [ ]Tachy  [ ]Gunnar [ ]Murmur [ ]Other  GASTROINTESTINAL:  [x]Soft  [ ]Distended   [x]+BS  [x]Non tender [ ]Tender  [ ]PEG [ ]OGT/ NGT   Last BM:    GENITOURINARY:  [x]Normal [ ]Incontinent   [ ]Oliguria/Anuria   [ ]Vasquez  MUSCULOSKELETAL:   [ ]Normal   [x]Weakness  [ ]Bed/Wheelchair bound [ ]Edema  NEUROLOGIC:   [x]No focal deficits  [ ] Cognitive impairment  [ ] Dysphagia [ ]Dysarthria [ ] Paresis [ ]Other   SKIN:   [x]Normal  [ ]Rash   [ ]Pressure ulcer(s) [ ]y [ ]n present on admission    CRITICAL CARE:  [ ] Shock Present  [ ]Septic [ ]Cardiogenic [ ]Neurologic [ ]Hypovolemic  [ ]  Vasopressors [ ]  Inotropes   [ ]Respiratory failure present [ ]Mechanical ventilation [ ]Non-invasive ventilatory support [ ]High flow  Mode: AC/ CMV (Assist Control/ Continuous Mandatory Ventilation), RR (machine): 14, TV (machine): 400, FiO2: 60, PEEP: 6, ITime: 1, MAP: 10, PIP: 14  [ ]Acute  [ ]Chronic [ ]Hypoxic  [ ]Hypercarbic [ ]Other  [ ]Other organ failure     LABS:                        13.0   18.65 )-----------( 392      ( 16 Apr 2025 14:03 )             42.1   04-16    137  |  102  |  19  ----------------------------<  270[H]  4.1   |  15[L]  |  0.79    Ca    8.0[L]      16 Apr 2025 14:03  Phos  3.6     04-16  Mg     1.8     04-16    TPro  7.3  /  Alb  3.5  /  TBili  0.4  /  DBili  x   /  AST  59[H]  /  ALT  21  /  AlkPhos  95  04-16  PT/INR - ( 16 Apr 2025 09:35 )   PT: 11.1 sec;   INR: 0.94 ratio         PTT - ( 16 Apr 2025 09:35 )  PTT:26.9 sec    Urinalysis Basic - ( 16 Apr 2025 14:03 )    Color: x / Appearance: x / SG: x / pH: x  Gluc: 270 mg/dL / Ketone: x  / Bili: x / Urobili: x   Blood: x / Protein: x / Nitrite: x   Leuk Esterase: x / RBC: x / WBC x   Sq Epi: x / Non Sq Epi: x / Bacteria: x      RADIOLOGY & ADDITIONAL STUDIES:    PROTEIN CALORIE MALNUTRITION PRESENT: [ ]mild [ ]moderate [ ]severe [ ]underweight [ ]morbid obesity  https://www.andeal.org/vault/2440/web/files/ONC/Table_Clinical%20Characteristics%20to%20Document%20Malnutrition-White%20JV%20et%20al%202012.pdf    Height (cm): 152.4 (04-16-25 @ 13:05), 167.6 (04-16-25 @ 09:13), 167.6 (03-18-25 @ 10:27)  Weight (kg): 69.3 (04-16-25 @ 13:05), 75 (04-16-25 @ 09:13), 72.6 (03-18-25 @ 10:27)  BMI (kg/m2): 29.8 (04-16-25 @ 13:05), 26.7 (04-16-25 @ 09:13), 25.8 (03-18-25 @ 10:27)    [ ]PPSV2 < or = to 30% [ ]significant weight loss  [ ]poor nutritional intake  [ ]anasarca[ ]Artificial Nutrition      Other REFERRALS:  [ ]Hospice  [ ]Child Life  [ ]Social Work  [ ]Case management [ ]Holistic Therapy    Date of Service: 04-17-25 @ 11:06    HPI:  89F, not on AC/AP. PMH dementia, GERD, presented to  from [facility] senior living after being found unresponsive. LKW 4/15 at  9pm. Was intubated at  and txferred to [facility] for neuroICU care. CTH showed Right basal ganglia IPH w/ IVH extension, ICH score 5. NIHSS28. GCS 4T.   (16 Apr 2025 13:56)    PERTINENT PM/SXH:   Depression      No significant past surgical history    FAMILY HISTORY:  Family history of heart disease (Father, Mother)      SOCIAL HISTORY:   Significant other/partner[ ]  Children[x ]  Muslim/Spirituality:  Substance hx:  [ ]   Tobacco hx:  [ ]   Alcohol hx: [ ]   Home Opioid hx:  [ ] I-Stop Reference No:  Living Situation: [ ]Home  [ ]Long term care  [ ]Rehab [ ]Other    ADVANCE DIRECTIVES:    DNR/MOLST  [ ]  Living Will  [ ]   DECISION MAKER(s):  [ ] Health Care Proxy(s)  [x ] Surrogate(s)  [ ] Guardian           Name(s): Phone Number(s):  Sera Kraft (daughter)   BASELINE (I)ADL(s) (prior to admission):  [place]: [ ]Total  [ ] Moderate [ ]Dependent    Allergies    No Known Allergies    Intolerances    MEDICATIONS  (STANDING):  HYDROmorphone  Injectable 1 milliGRAM(s) IV Push every 6 hours  levETIRAcetam   Injectable 500 milliGRAM(s) IV Push every 12 hours    MEDICATIONS  (PRN):  acetaminophen     Tablet .. 650 milliGRAM(s) Oral every 6 hours PRN Temp greater or equal to 38C (100.4F), Mild Pain (1 - 3), Moderate Pain (4 - 6), Severe Pain (7 - 10)  glycopyrrolate Injectable 0.4 milliGRAM(s) IV Push four times a day PRN Secretions  HYDROmorphone  Injectable 1.5 milliGRAM(s) IV Push every 1 hour PRN Severe Pain (7 - 10)  HYDROmorphone  Injectable 1.5 milliGRAM(s) IV Push every 1 hour PRN dyspnea  LORazepam   Injectable 1 milliGRAM(s) IV Push every 4 hours PRN Anxiety  ondansetron Injectable 4 milliGRAM(s) IV Push every 6 hours PRN Nausea and/or Vomiting      ITEMS NOT CHECKED ARE NOT PRESENT  PRESENT SYMPTOMS: [x ]Unable to self-report see CPOT, PAINADs, RDOS  Source if other than patient:  [ ]Family   [x ]Team     Pain: [ ]yes [ ]no  QOL impact -   Location -                    Aggravating factors -  Quality -  Radiation -  Timing-  Severity (0-10 scale):  Minimal acceptable level (0-10 scale):       Dyspnea:                           [ ]Mild [ ]Moderate [ ]Severe  Anxiety:                             [ ]Mild [ ]Moderate [ ]Severe  Fatigue:                             [ ]Mild [ ]Moderate [ ]Severe  Nausea:                             [ ]Mild [ ]Moderate [ ]Severe  Loss of appetite:              [ ]Mild [ ]Moderate [ ]Severe  Constipation:                    [ ]Mild [ ]Moderate [ ]Severe    PCSSQ [Palliative Care Spiritual Screening Question]   Severity (0-10):  Score of 4 or > indicate consideration of Chaplaincy referral.  Chaplaincy Referral: [ ] yes [x ] refused [ ] following [ ] deferred    Caregiver [place]? : [ ] yes [x ] no [ ] deferred:  Social work referral [ ] Patient & Family Centered Care Referral [ ]     Anticipatory Grief Present?: [x ] yes [ ] no  [ ] deferred: Palliative Social work referral [ x]  Patient & Family Centered Care Referral [ ]       Other Symptoms:  [ ]All other review of systems negative   [x ] Unable to obtain due to poor mentation    PHYSICAL EXAM:  Vital Signs Last 24 Hrs  T(C): 38.6 (17 Apr 2025 07:00), Max: 38.9 (17 Apr 2025 05:24)  T(F): 101.5 (17 Apr 2025 07:00), Max: 102 (17 Apr 2025 05:24)  HR: 131 (17 Apr 2025 07:00) (78 - 131)  BP: 112/63 (17 Apr 2025 07:00) (66/53 - 133/79)  BP(mean): 82 (17 Apr 2025 07:00) (58 - 100)  RR: 28 (17 Apr 2025 07:00) (13 - 28)  SpO2: 81% (17 Apr 2025 07:00) (81% - 100%)    Parameters below as of 17 Apr 2025 07:00  Patient On (Oxygen Delivery Method): room air     I&O's Summary    16 Apr 2025 07:01  -  17 Apr 2025 07:00  --------------------------------------------------------  IN: 352 mL / OUT: 815 mL / NET: -463 mL        GENERAL:  [ ]Alert  [ ]Oriented x 3  [ ]Lethargic  [ ]Cachexia  [x ]Unarousable  [ ]Verbal  [ ]Non-Verbal  Behavioral:   [ ]Anxiety  [ ]Delirium [ ]Agitation [ ]Other  HEENT:  [x]Normal   [ ]Dry mouth   [ ]ET Tube/Trach  [ ]Oral lesions  PULMONARY:   [ ]Clear [x ]Tachypnea  [ ]Audible excessive secretions   [ ]Rhonchi        [ ]Right [ ]Left [ ]Bilateral  [ ]Crackles        [ ]Right [ ]Left [ ]Bilateral  [ ]Wheezing     [ ]Right [ ]Left [ ]Bilateral  [ ]Diminished BS [ ] Right [ ]Left [ ]Bilateral  CARDIOVASCULAR:    [x]Regular [ ]Irregular [ ]Tachy  [ ]Gunnar [ ]Murmur [ ]Other  GASTROINTESTINAL:  [x]Soft  [ ]Distended   [x]+BS  [x]Non tender [ ]Tender  [ ]PEG [ ]OGT/ NGT   Last BM:    GENITOURINARY:  [ ]Normal [x ]Incontinent   [ ]Oliguria/Anuria   [ ]Vasquez  MUSCULOSKELETAL:   [ ]Normal   [x]Weakness  [ x]Bed/Wheelchair bound [ ]Edema  NEUROLOGIC:   [ ]No focal deficits  [ x] Cognitive impairment  [ ] Dysphagia [ ]Dysarthria [ ] Paresis [ ]Other   SKIN:   [x]Normal  [ ]Rash   [ ]Pressure ulcer(s) [ ]y [ ]n present on admission    CRITICAL CARE:  [ ] Shock Present  [ ]Septic [ ]Cardiogenic [ ]Neurologic [ ]Hypovolemic  [ ]  Vasopressors [ ]  Inotropes   [x ]Respiratory failure present [ ]Mechanical ventilation [ ]Non-invasive ventilatory support [ ]High flow  Mode: AC/ CMV (Assist Control/ Continuous Mandatory Ventilation), RR (machine): 14, TV (machine): 400, FiO2: 60, PEEP: 6, ITime: 1, MAP: 10, PIP: 14  [ ]Acute  [ ]Chronic [ ]Hypoxic  [ ]Hypercarbic [ ]Other  [ ]Other organ failure     LABS:                        13.0   18.65 )-----------( 392      ( 16 Apr 2025 14:03 )             42.1   04-16    137  |  102  |  19  ----------------------------<  270[H]  4.1   |  15[L]  |  0.79    Ca    8.0[L]      16 Apr 2025 14:03  Phos  3.6     04-16  Mg     1.8     04-16    TPro  7.3  /  Alb  3.5  /  TBili  0.4  /  DBili  x   /  AST  59[H]  /  ALT  21  /  AlkPhos  95  04-16  PT/INR - ( 16 Apr 2025 09:35 )   PT: 11.1 sec;   INR: 0.94 ratio         PTT - ( 16 Apr 2025 09:35 )  PTT:26.9 sec    Urinalysis Basic - ( 16 Apr 2025 14:03 )    Color: x / Appearance: x / SG: x / pH: x  Gluc: 270 mg/dL / Ketone: x  / Bili: x / Urobili: x   Blood: x / Protein: x / Nitrite: x   Leuk Esterase: x / RBC: x / WBC x   Sq Epi: x / Non Sq Epi: x / Bacteria: x      RADIOLOGY & ADDITIONAL STUDIES:    PROTEIN CALORIE MALNUTRITION PRESENT: [ ]mild [ ]moderate [ ]severe [ ]underweight [ ]morbid obesity  https://www.andeal.org/vault/2440/web/files/ONC/Table_Clinical%20Characteristics%20to%20Document%20Malnutrition-White%20JV%20et%20al%202012.pdf    Height (cm): 152.4 (04-16-25 @ 13:05), 167.6 (04-16-25 @ 09:13), 167.6 (03-18-25 @ 10:27)  Weight (kg): 69.3 (04-16-25 @ 13:05), 75 (04-16-25 @ 09:13), 72.6 (03-18-25 @ 10:27)  BMI (kg/m2): 29.8 (04-16-25 @ 13:05), 26.7 (04-16-25 @ 09:13), 25.8 (03-18-25 @ 10:27)    [ ]PPSV2 < or = to 30% [ ]significant weight loss  [ ]poor nutritional intake  [ ]anasarca[ ]Artificial Nutrition      Other REFERRALS:  [ ]Hospice  [ ]Child Life  [ ]Social Work  [ ]Case management [ ]Holistic Therapy    Date of Service: 04-17-25 @ 11:06    HPI:  89F, not on AC/AP. PMH dementia, GERD, presented to  from [facility] senior living after being found unresponsive. LKW 4/15 at  9pm. Was intubated at  and txferred to [facility] for neuroICU care. CTH showed Right basal ganglia IPH w/ IVH extension, ICH score 5. NIHSS28. GCS 4T.   (16 Apr 2025 13:56)    PERTINENT PM/SXH:   Depression    No significant past surgical history    FAMILY HISTORY:  Family history of heart disease (Father, Mother)      SOCIAL HISTORY:   Significant other/partner[ ]  Children[x ]  Nondenominational/Spirituality:  Substance hx:  [ ]   Tobacco hx:  [ ]   Alcohol hx: [ ]   Home Opioid hx:  [ ] I-Stop Reference No:  Living Situation: [ ]Home  [ ]Long term care  [ ]Rehab [ x]Other- ABDULAZIZ    ADVANCE DIRECTIVES:    DNR/MOLST  [ ]  Living Will  [ ]   DECISION MAKER(s):  [ ] Health Care Proxy(s)  [x ] Surrogate(s)  [ ] Guardian           Name(s): Phone Number(s):  Sera Kraft (daughter)   BASELINE (I)ADL(s) (prior to admission):  [place]: [ ]Total  [ x] Moderate [ ]Dependent    Allergies    No Known Allergies    Intolerances    MEDICATIONS  (STANDING):  HYDROmorphone  Injectable 1 milliGRAM(s) IV Push every 6 hours  levETIRAcetam   Injectable 500 milliGRAM(s) IV Push every 12 hours    MEDICATIONS  (PRN):  acetaminophen     Tablet .. 650 milliGRAM(s) Oral every 6 hours PRN Temp greater or equal to 38C (100.4F), Mild Pain (1 - 3), Moderate Pain (4 - 6), Severe Pain (7 - 10)  glycopyrrolate Injectable 0.4 milliGRAM(s) IV Push four times a day PRN Secretions  HYDROmorphone  Injectable 1.5 milliGRAM(s) IV Push every 1 hour PRN Severe Pain (7 - 10)  HYDROmorphone  Injectable 1.5 milliGRAM(s) IV Push every 1 hour PRN dyspnea  LORazepam   Injectable 1 milliGRAM(s) IV Push every 4 hours PRN Anxiety  ondansetron Injectable 4 milliGRAM(s) IV Push every 6 hours PRN Nausea and/or Vomiting      ITEMS NOT CHECKED ARE NOT PRESENT  PRESENT SYMPTOMS: [x ]Unable to self-report see CPOT, PAINADs, RDOS  Source if other than patient:  [ ]Family   [x ]Team     Pain: [ ]yes [ ]no  QOL impact -   Location -                    Aggravating factors -  Quality -  Radiation -  Timing-  Severity (0-10 scale):  Minimal acceptable level (0-10 scale):       Dyspnea:                           [ ]Mild [ ]Moderate [ ]Severe  Anxiety:                             [ ]Mild [ ]Moderate [ ]Severe  Fatigue:                             [ ]Mild [ ]Moderate [ ]Severe  Nausea:                             [ ]Mild [ ]Moderate [ ]Severe  Loss of appetite:              [ ]Mild [ ]Moderate [ ]Severe  Constipation:                    [ ]Mild [ ]Moderate [ ]Severe    PCSSQ [Palliative Care Spiritual Screening Question]   Severity (0-10):  Score of 4 or > indicate consideration of Chaplaincy referral.  Chaplaincy Referral: [ ] yes [x ] refused [ ] following [ ] deferred    Caregiver [place]? : [ ] yes [x ] no [ ] deferred:  Social work referral [ ] Patient & Family Centered Care Referral [ ]     Anticipatory Grief Present?: [x ] yes [ ] no  [ ] deferred: Palliative Social work referral [ x]  Patient & Family Centered Care Referral [ ]       Other Symptoms:  [ ]All other review of systems negative   [x ] Unable to obtain due to poor mentation    PHYSICAL EXAM:  Vital Signs Last 24 Hrs  T(C): 38.6 (17 Apr 2025 07:00), Max: 38.9 (17 Apr 2025 05:24)  T(F): 101.5 (17 Apr 2025 07:00), Max: 102 (17 Apr 2025 05:24)  HR: 131 (17 Apr 2025 07:00) (78 - 131)  BP: 112/63 (17 Apr 2025 07:00) (66/53 - 133/79)  BP(mean): 82 (17 Apr 2025 07:00) (58 - 100)  RR: 28 (17 Apr 2025 07:00) (13 - 28)  SpO2: 81% (17 Apr 2025 07:00) (81% - 100%)    Parameters below as of 17 Apr 2025 07:00  Patient On (Oxygen Delivery Method): room air     I&O's Summary    16 Apr 2025 07:01  -  17 Apr 2025 07:00  --------------------------------------------------------  IN: 352 mL / OUT: 815 mL / NET: -463 mL        GENERAL:  [ ]Alert  [ ]Oriented x 3  [ ]Lethargic  [ ]Cachexia  [x ]Unarousable  [ ]Verbal  [ ]Non-Verbal  Behavioral:   [ ]Anxiety  [ ]Delirium [ ]Agitation [ ]Other  HEENT:  [x]Normal   [ ]Dry mouth   [ ]ET Tube/Trach  [ ]Oral lesions  PULMONARY:   [ ]Clear [x ]Tachypnea  [ ]Audible excessive secretions   [ ]Rhonchi        [ ]Right [ ]Left [ ]Bilateral  [ ]Crackles        [ ]Right [ ]Left [ ]Bilateral  [ ]Wheezing     [ ]Right [ ]Left [ ]Bilateral  [ ]Diminished BS [ ] Right [ ]Left [ ]Bilateral  CARDIOVASCULAR:    [x]Regular [ ]Irregular [ ]Tachy  [ ]Gunnar [ ]Murmur [ ]Other  GASTROINTESTINAL:  [x]Soft  [ ]Distended   [x]+BS  [x]Non tender [ ]Tender  [ ]PEG [ ]OGT/ NGT   Last BM:    GENITOURINARY:  [ ]Normal [x ]Incontinent   [ ]Oliguria/Anuria   [ ]Vasquez  MUSCULOSKELETAL:   [ ]Normal   [x]Weakness  [ x]Bed/Wheelchair bound [ ]Edema  NEUROLOGIC:   [ ]No focal deficits  [ x] Cognitive impairment  [ ] Dysphagia [ ]Dysarthria [ ] Paresis [ ]Other   SKIN:   [x]Normal  [ ]Rash   [ ]Pressure ulcer(s) [ ]y [ ]n present on admission    CRITICAL CARE:  [ ] Shock Present  [ ]Septic [ ]Cardiogenic [ ]Neurologic [ ]Hypovolemic  [ ]  Vasopressors [ ]  Inotropes   [x ]Respiratory failure present [ ]Mechanical ventilation [ ]Non-invasive ventilatory support [ ]High flow  Mode: AC/ CMV (Assist Control/ Continuous Mandatory Ventilation), RR (machine): 14, TV (machine): 400, FiO2: 60, PEEP: 6, ITime: 1, MAP: 10, PIP: 14  [ ]Acute  [ ]Chronic [ ]Hypoxic  [ ]Hypercarbic [ ]Other  [ ]Other organ failure     LABS:                        13.0   18.65 )-----------( 392      ( 16 Apr 2025 14:03 )             42.1   04-16    137  |  102  |  19  ----------------------------<  270[H]  4.1   |  15[L]  |  0.79    Ca    8.0[L]      16 Apr 2025 14:03  Phos  3.6     04-16  Mg     1.8     04-16    TPro  7.3  /  Alb  3.5  /  TBili  0.4  /  DBili  x   /  AST  59[H]  /  ALT  21  /  AlkPhos  95  04-16  PT/INR - ( 16 Apr 2025 09:35 )   PT: 11.1 sec;   INR: 0.94 ratio         PTT - ( 16 Apr 2025 09:35 )  PTT:26.9 sec    Urinalysis Basic - ( 16 Apr 2025 14:03 )    Color: x / Appearance: x / SG: x / pH: x  Gluc: 270 mg/dL / Ketone: x  / Bili: x / Urobili: x   Blood: x / Protein: x / Nitrite: x   Leuk Esterase: x / RBC: x / WBC x   Sq Epi: x / Non Sq Epi: x / Bacteria: x      RADIOLOGY & ADDITIONAL STUDIES:  < from: CT Head No Cont (04.16.25 @ 11:09) >  IMPRESSION:    CT head: Acute right basal ganglia hemorrhage measuring 4.3 x 2.3 x 2.5   cm with dissection into the lateral ventricles, third ventricle, fourth   ventricle. Moderate to severe hydrocephalus. Right to left midline shift   measures 1 cm. Findings discussed with DrPadmaja Physician:  STACY CANTOR at   11:12 AM.    CT C-spine: Moderate to severe spondylosis. No acute osseous abnormality.   Consider MRI as clinically warranted.    --- End of Report ---            ANTONIO GOMEZ MD; Attending Radiologist  This document has been electronically signed. Apr 16 2025 11:19AM    < end of copied text >      PROTEIN CALORIE MALNUTRITION PRESENT: [ ]mild [ ]moderate [ ]severe [ ]underweight [ ]morbid obesity  https://www.andeal.org/vault/2440/web/files/ONC/Table_Clinical%20Characteristics%20to%20Document%20Malnutrition-White%20JV%20et%20al%202012.pdf    Height (cm): 152.4 (04-16-25 @ 13:05), 167.6 (04-16-25 @ 09:13), 167.6 (03-18-25 @ 10:27)  Weight (kg): 69.3 (04-16-25 @ 13:05), 75 (04-16-25 @ 09:13), 72.6 (03-18-25 @ 10:27)  BMI (kg/m2): 29.8 (04-16-25 @ 13:05), 26.7 (04-16-25 @ 09:13), 25.8 (03-18-25 @ 10:27)    [ ]PPSV2 < or = to 30% [ ]significant weight loss  [ ]poor nutritional intake  [ ]anasarca[ ]Artificial Nutrition      Other REFERRALS:  [ ]Hospice  [ ]Child Life  [ ]Social Work  [ ]Case management [ ]Holistic Therapy

## 2025-04-17 NOTE — PROGRESS NOTE ADULT - ASSESSMENT
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    NEURO:   - Q1H neurochecks.   - Repeat CTH in AM.   - Keppra 500BID.  Sedation/Pain control:   Activity: [] mobilize as tolerated [] Bedrest [X] PT [X] OT [] PMNR    PULM:  SpO2 goal >92%.   - PRVC 400/14/6/60%.  - CTA Chest w/o PE.    CV:  SBP goal 100-160.  - Lipid panel and TTE results pending.    RENAL:  Fluids: IV 3% NaCl at 50cc/hr  - BMP goal 145-155.     GI:  Diet: NPO   GI prophylaxis [] not indicated [X] PPI [] other:  Bowel regimen [] colace [X] senna [] other: Miralax    ENDO:   Goal euglycemia (-180)  - A1c and thyroid panel results pending.    HEME/ONC:  VTE prophylaxis:   - BLE Dopp pending.   - SCDs    ID:  Monitor for fevers    MISC:    SOCIAL/FAMILY:  [] awaiting [X] updated at bedside [] family meeting    CODE STATUS:  [] Full Code [X] DNR [] DNI [] Palliative/Comfort Care    DISPOSITION:  [X] ICU [] Stroke Unit [] Floor [] EMU [] RCU [] PCU
89F, not on AC/AP. PMH dementia, GERD, presented to  from [facility] after being found unresponsive. LKW 4/15 at  9pm. Was intubated at  and txferred to [facility] for neuroICU care. CTH showed Right basal ganglia IPH w/ IVH extension. After GOC discussion with family, family decided to transition the patient comfort care.    Pt placed on comfort care measures  MOLST form filled according to wishes

## 2025-04-17 NOTE — CONSULT NOTE ADULT - RESPIRATORY DISTRESS OBSERVATION: ACCESSORY MUSCLE
[FreeTextEntry1] : Assessment \par Smoker \par smoking cessation discussed \par Carotid doppler scan done 1 mos ago \par Risk factor modification discussed
Slight rise

## 2025-04-17 NOTE — CONSULT NOTE ADULT - ASSESSMENT
HPI: 89F DNR h/o dementia, nursing home resident found in bed unresponsive this AM. Plts/coags WNL. CTH w/ [id number] right basal ganglia IPH w/ IVH, 3rd and 4th casted, ventriculomegaly w/mod transependymal flow.  Exam: Intubated, no EO, right pupil 2slugish, left surgical, (+) corneals/OB, no cough/gag, BUE extend, BLE TF. ICH score 5.     -no neurosurgical intervention   -admit NSCU under Karen  -palliative consult   -[facility]
A/P  89F, not on AC/AP. PMH dementia, GERD, presented to  from [facility] after being found unresponsive. LKW 4/15 at  9pm. Was intubated at  and txferred to [facility] for neuroICU care. CTH showed Right basal ganglia IPH w/ IVH extension.     #Acute right basal ganglia hemorrhage  -CT Head shows Acute right basal ganglia hemorrhage measuring 4.3 x 2.3 x 2.5 cm with dissection into the lateral ventricles, third ventricle, fourth ventricle. Moderate to severe hydrocephalus. Right to left midline shift measures 1 cm.   -CTA Chest with no PE  -Duplex b/l shows no DVT  -Elevated HST noted   -TTE pending  -intubated, poor prognosis, no acute intervention planned   -bp parameters as per neuro  -no neurosurgical intervention per neurosx   -palliative consult   -GOC per family/ICU    care per NSCU  
89 y/o F hx dementia, GERD, presented to [facility] from [facility] after being found unresponsive. LKW 4/15 at  9pm. Was intubated at  and txferred to [facility] for neuroICU care. CTH showed Right basal ganglia IPH w/ IVH extension, ICH score 5. Pt compassionately extubated in NSCU on 4/16. Pt pending transfer to PCU for symptom management and end of life care.

## 2025-04-17 NOTE — PROGRESS NOTE ADULT - ATTENDING COMMENTS
extensive discussion with daughter and three granddaughters at bedside, decision made for compassionate extubation and comfort measures  palliative care consulted

## 2025-04-21 LAB
CULTURE RESULTS: SIGNIFICANT CHANGE UP
CULTURE RESULTS: SIGNIFICANT CHANGE UP
SPECIMEN SOURCE: SIGNIFICANT CHANGE UP
SPECIMEN SOURCE: SIGNIFICANT CHANGE UP